# Patient Record
Sex: FEMALE | Race: WHITE | NOT HISPANIC OR LATINO
[De-identification: names, ages, dates, MRNs, and addresses within clinical notes are randomized per-mention and may not be internally consistent; named-entity substitution may affect disease eponyms.]

---

## 2019-09-18 ENCOUNTER — APPOINTMENT (OUTPATIENT)
Dept: RHEUMATOLOGY | Facility: CLINIC | Age: 69
End: 2019-09-18
Payer: MEDICARE

## 2019-09-18 VITALS
HEART RATE: 64 BPM | TEMPERATURE: 98 F | WEIGHT: 123 LBS | SYSTOLIC BLOOD PRESSURE: 149 MMHG | DIASTOLIC BLOOD PRESSURE: 73 MMHG | BODY MASS INDEX: 21.79 KG/M2 | OXYGEN SATURATION: 95 % | HEIGHT: 63 IN

## 2019-09-18 DIAGNOSIS — Z87.891 PERSONAL HISTORY OF NICOTINE DEPENDENCE: ICD-10-CM

## 2019-09-18 DIAGNOSIS — Z80.9 FAMILY HISTORY OF MALIGNANT NEOPLASM, UNSPECIFIED: ICD-10-CM

## 2019-09-18 DIAGNOSIS — Z78.9 OTHER SPECIFIED HEALTH STATUS: ICD-10-CM

## 2019-09-18 DIAGNOSIS — Z82.49 FAMILY HISTORY OF ISCHEMIC HEART DISEASE AND OTHER DISEASES OF THE CIRCULATORY SYSTEM: ICD-10-CM

## 2019-09-18 PROBLEM — Z00.00 ENCOUNTER FOR PREVENTIVE HEALTH EXAMINATION: Status: ACTIVE | Noted: 2019-09-18

## 2019-09-18 PROCEDURE — 99213 OFFICE O/P EST LOW 20 MIN: CPT | Mod: 25

## 2019-09-18 PROCEDURE — 36415 COLL VENOUS BLD VENIPUNCTURE: CPT

## 2019-09-18 RX ORDER — ASPIRIN 81 MG/1
81 TABLET, CHEWABLE ORAL
Refills: 0 | Status: ACTIVE | COMMUNITY

## 2019-09-18 RX ORDER — FOLIC ACID 1 MG
1 TABLET ORAL
Refills: 0 | Status: ACTIVE | COMMUNITY

## 2019-09-18 RX ORDER — ALPRAZOLAM 0.5 MG/1
0.5 TABLET ORAL
Qty: 120 | Refills: 0 | Status: ACTIVE | COMMUNITY
Start: 2019-07-26

## 2019-09-18 RX ORDER — MULTIVIT-MIN/FOLIC/VIT K/LYCOP 400-300MCG
TABLET ORAL
Refills: 0 | Status: ACTIVE | COMMUNITY

## 2019-09-18 RX ORDER — B-COMPLEX WITH VITAMIN C
TABLET ORAL
Refills: 0 | Status: ACTIVE | COMMUNITY

## 2019-09-18 RX ORDER — BACILLUS COAGULANS/INULIN 1B-250 MG
CAPSULE ORAL
Refills: 0 | Status: ACTIVE | COMMUNITY

## 2019-09-18 NOTE — DATA REVIEWED
[FreeTextEntry1] : she brings her most recent labs from June of 2019 these were reviewed with patient AST and ALT were normalalkaline phosphatase was normal. Albumin was just below cutoff was normal at 3.4 creatinine was slightly above normal at 1.12 CBC was normal.

## 2019-09-18 NOTE — REVIEW OF SYSTEMS
[Fever] : no fever [Chills] : no chills [Feeling Poorly] : not feeling poorly [Red Eyes] : eyes not red [Dry Eyes] : no dryness of the eyes [Sore Throat] : no sore throat [Chest Pain] : no chest pain [Palpitations] : no palpitations [Shortness Of Breath] : no shortness of breath [Cough] : no cough [SOB on Exertion] : no shortness of breath during exertion [Abdominal Pain] : no abdominal pain [Vomiting] : no vomiting [Diarrhea] : no diarrhea [Heartburn] : no heartburn [Joint Swelling] : no joint swelling [Joint Pain] : no joint pain [Arthralgias] : no arthralgias [Joint Stiffness] : no joint stiffness [Dizziness] : no dizziness [Difficulty Walking] : no difficulty walking [FreeTextEntry4] : No oral ulcer [FreeTextEntry9] : Rare pain in left wrist

## 2019-09-18 NOTE — HISTORY OF PRESENT ILLNESS
[FreeTextEntry1] : This is a 68-year-old woman she presents for followup visit she has long-standing rheumatoid arthritis but her she is on methotrexate as monotherapy she takes 20 mg once a week she is also on folic acid. She is otherwise doing well. She has had a knee replacement done comorbid conditions include elevated cholesterol she is on Crestor she's had carotid changes. She is generally doing well she denies any real significant pain swelling or stiffness of joints she has occasional flare in left wrist with occasional discomfort but none today. She does report she is scheduled for an updated bone density she did have a minor injury to her left rib cage to have some tenderness but this has improved significantly. No defined or definite fractures. She has had no nausea no vomiting no GI upset no coughing or shortness of breath.

## 2019-09-19 LAB
ALBUMIN SERPL ELPH-MCNC: 4.5 G/DL
ALP BLD-CCNC: 91 U/L
ALT SERPL-CCNC: 18 U/L
ANION GAP SERPL CALC-SCNC: 13 MMOL/L
AST SERPL-CCNC: 22 U/L
BASOPHILS # BLD AUTO: 0.04 K/UL
BASOPHILS NFR BLD AUTO: 0.6 %
BILIRUB SERPL-MCNC: 0.4 MG/DL
BUN SERPL-MCNC: 12 MG/DL
CALCIUM SERPL-MCNC: 10.4 MG/DL
CHLORIDE SERPL-SCNC: 100 MMOL/L
CO2 SERPL-SCNC: 29 MMOL/L
CREAT SERPL-MCNC: 0.6 MG/DL
EOSINOPHIL # BLD AUTO: 0.16 K/UL
EOSINOPHIL NFR BLD AUTO: 2.5 %
GLUCOSE SERPL-MCNC: 92 MG/DL
HCT VFR BLD CALC: 38.6 %
HGB BLD-MCNC: 11.8 G/DL
IMM GRANULOCYTES NFR BLD AUTO: 0.2 %
LYMPHOCYTES # BLD AUTO: 1.37 K/UL
LYMPHOCYTES NFR BLD AUTO: 21 %
MAN DIFF?: NORMAL
MCHC RBC-ENTMCNC: 29.1 PG
MCHC RBC-ENTMCNC: 30.6 GM/DL
MCV RBC AUTO: 95.3 FL
MONOCYTES # BLD AUTO: 0.5 K/UL
MONOCYTES NFR BLD AUTO: 7.7 %
NEUTROPHILS # BLD AUTO: 4.43 K/UL
NEUTROPHILS NFR BLD AUTO: 68 %
PLATELET # BLD AUTO: 210 K/UL
POTASSIUM SERPL-SCNC: 4.1 MMOL/L
PROT SERPL-MCNC: 7.2 G/DL
RBC # BLD: 4.05 M/UL
RBC # FLD: 15.9 %
SODIUM SERPL-SCNC: 142 MMOL/L
WBC # FLD AUTO: 6.51 K/UL

## 2019-12-09 ENCOUNTER — OTHER (OUTPATIENT)
Age: 69
End: 2019-12-09

## 2019-12-19 ENCOUNTER — APPOINTMENT (OUTPATIENT)
Dept: RHEUMATOLOGY | Facility: CLINIC | Age: 69
End: 2019-12-19
Payer: MEDICARE

## 2019-12-19 ENCOUNTER — TRANSCRIPTION ENCOUNTER (OUTPATIENT)
Age: 69
End: 2019-12-19

## 2019-12-19 VITALS
HEART RATE: 68 BPM | OXYGEN SATURATION: 98 % | SYSTOLIC BLOOD PRESSURE: 136 MMHG | BODY MASS INDEX: 21.79 KG/M2 | DIASTOLIC BLOOD PRESSURE: 73 MMHG | HEIGHT: 63 IN | TEMPERATURE: 97.7 F | WEIGHT: 123 LBS

## 2019-12-19 DIAGNOSIS — R21 RASH AND OTHER NONSPECIFIC SKIN ERUPTION: ICD-10-CM

## 2019-12-19 PROCEDURE — 99213 OFFICE O/P EST LOW 20 MIN: CPT

## 2019-12-20 LAB
ALBUMIN SERPL ELPH-MCNC: 4.1 G/DL
ALP BLD-CCNC: 88 U/L
ALT SERPL-CCNC: 19 U/L
ANION GAP SERPL CALC-SCNC: 11 MMOL/L
AST SERPL-CCNC: 25 U/L
BASOPHILS # BLD AUTO: 0.03 K/UL
BASOPHILS NFR BLD AUTO: 0.5 %
BILIRUB SERPL-MCNC: 0.3 MG/DL
BUN SERPL-MCNC: 15 MG/DL
CALCIUM SERPL-MCNC: 9.4 MG/DL
CHLORIDE SERPL-SCNC: 101 MMOL/L
CO2 SERPL-SCNC: 29 MMOL/L
CREAT SERPL-MCNC: 0.63 MG/DL
EOSINOPHIL # BLD AUTO: 0.21 K/UL
EOSINOPHIL NFR BLD AUTO: 3.4 %
GLUCOSE SERPL-MCNC: 93 MG/DL
HCT VFR BLD CALC: 35.9 %
HGB BLD-MCNC: 11 G/DL
IMM GRANULOCYTES NFR BLD AUTO: 0.3 %
LYMPHOCYTES # BLD AUTO: 1.15 K/UL
LYMPHOCYTES NFR BLD AUTO: 18.6 %
MAN DIFF?: NORMAL
MCHC RBC-ENTMCNC: 28.7 PG
MCHC RBC-ENTMCNC: 30.6 GM/DL
MCV RBC AUTO: 93.7 FL
MONOCYTES # BLD AUTO: 0.39 K/UL
MONOCYTES NFR BLD AUTO: 6.3 %
NEUTROPHILS # BLD AUTO: 4.38 K/UL
NEUTROPHILS NFR BLD AUTO: 70.9 %
PLATELET # BLD AUTO: 205 K/UL
POTASSIUM SERPL-SCNC: 4.3 MMOL/L
PROT SERPL-MCNC: 6.7 G/DL
RBC # BLD: 3.83 M/UL
RBC # FLD: 15.5 %
SODIUM SERPL-SCNC: 141 MMOL/L
WBC # FLD AUTO: 6.18 K/UL

## 2019-12-20 NOTE — REVIEW OF SYSTEMS
[Feeling Poorly] : not feeling poorly [Feeling Tired] : not feeling tired [Eye Pain] : no eye pain [Shortness Of Breath] : no shortness of breath [Red Eyes] : eyes not red [Wheezing] : no wheezing [Cough] : no cough [SOB on Exertion] : no shortness of breath during exertion [Abdominal Pain] : no abdominal pain [Vomiting] : no vomiting [Constipation] : no constipation [Joint Pain] : no joint pain [Arthralgias] : no arthralgias [Diarrhea] : no diarrhea [Joint Swelling] : no joint swelling [Skin Lesions] : no skin lesions [Joint Stiffness] : no joint stiffness [Skin Wound] : no skin wound [Itching] : no itching [FreeTextEntry3] : no dryness

## 2019-12-20 NOTE — ASSESSMENT
[FreeTextEntry1] : This is a 69-year-old woman she is history of osteoporosis and rheumatoid arthritis things are clinically stable we've again discussed risks and benefits of alendronate use for osteoporosis I also discussed this with her dentist there is no contraindication when she completes all her dental work in the next week or 2 she plans to start at 70 mg once a week.

## 2019-12-20 NOTE — HISTORY OF PRESENT ILLNESS
[FreeTextEntry1] : This is a 69-year-old woman she returns for reevaluation she comes to discuss management and treatment of osteoporosis. I have also had an opportunity to speak with her dentist regarding her use of alendronate she does have coexisting rheumatoid arthritis she is on methotrexate 20 mg once a week. She has been doing very well. She denies any significant pain swelling or stiffness of joints no morning stiffness no fevers or chills. No weight loss no coughing or shortness of breath.She has had no GI issues no mouth soreness of her

## 2019-12-20 NOTE — PHYSICAL EXAM
[General Appearance - Well Nourished] : well nourished [General Appearance - Well Developed] : well developed [] : no respiratory distress [Heart Rate And Rhythm] : heart rate was normal and rhythm regular [Respiration, Rhythm And Depth] : normal respiratory rhythm and effort [Auscultation Breath Sounds / Voice Sounds] : lungs were clear to auscultation bilaterally [Heart Sounds] : normal S1 and S2 [No CVA Tenderness] : no ~M costovertebral angle tenderness [Nail Clubbing] : no clubbing  or cyanosis of the fingernails [No Spinal Tenderness] : no spinal tenderness [Musculoskeletal - Swelling] : no joint swelling seen [FreeTextEntry1] : no dryness

## 2020-01-13 ENCOUNTER — TRANSCRIPTION ENCOUNTER (OUTPATIENT)
Age: 70
End: 2020-01-13

## 2020-01-15 NOTE — ASSESSMENT
[FreeTextEntry1] : 5-year-old woman long-standing rheumatoid arthritis currently on methotrexate 20 mg a week doing very well no changes in medicines. Updating bloods which will be reviewed with patient. She is scheduled for an updated bone density the next few weeks. I will await those results.
Pt presenting from UC with complaints of diffuse myalgia, cough, and sob worsening today.  Borderline hypotensive, but improved with fluids.  Noted to have LESLI without known prior baseline.  Flu A positive.  No e/o suprainfection.  Given borderline hypotension and kidney dysfunction in setting of flu decision made to admit for further monitoring.  Noted to have elevated dimer, but given positive flu and symptoms more c/w this would disregard dimer as I do not have clinical suspicion for PE.  Lactate unremarkable.  No role for abx given Flu positive and no e/o suprainfection.  Discussed case with hospitalist who has accepted for further w/u and management.  Further care per inpatient treatment team.

## 2020-03-09 ENCOUNTER — TRANSCRIPTION ENCOUNTER (OUTPATIENT)
Age: 70
End: 2020-03-09

## 2020-03-10 ENCOUNTER — TRANSCRIPTION ENCOUNTER (OUTPATIENT)
Age: 70
End: 2020-03-10

## 2020-03-20 ENCOUNTER — TRANSCRIPTION ENCOUNTER (OUTPATIENT)
Age: 70
End: 2020-03-20

## 2020-03-20 ENCOUNTER — NON-APPOINTMENT (OUTPATIENT)
Age: 70
End: 2020-03-20

## 2020-04-03 ENCOUNTER — APPOINTMENT (OUTPATIENT)
Dept: RHEUMATOLOGY | Facility: CLINIC | Age: 70
End: 2020-04-03
Payer: MEDICARE

## 2020-04-03 ENCOUNTER — TRANSCRIPTION ENCOUNTER (OUTPATIENT)
Age: 70
End: 2020-04-03

## 2020-04-03 PROCEDURE — G2012 BRIEF CHECK IN BY MD/QHP: CPT

## 2020-04-07 ENCOUNTER — NON-APPOINTMENT (OUTPATIENT)
Age: 70
End: 2020-04-07

## 2020-04-16 ENCOUNTER — APPOINTMENT (OUTPATIENT)
Dept: RHEUMATOLOGY | Facility: CLINIC | Age: 70
End: 2020-04-16

## 2020-06-01 ENCOUNTER — APPOINTMENT (OUTPATIENT)
Dept: RHEUMATOLOGY | Facility: CLINIC | Age: 70
End: 2020-06-01
Payer: MEDICARE

## 2020-06-01 PROCEDURE — 99213 OFFICE O/P EST LOW 20 MIN: CPT | Mod: 95

## 2020-06-01 NOTE — REVIEW OF SYSTEMS
[Joint Pain] : joint pain [Joint Swelling] : joint swelling [Arthralgias] : arthralgias [Fever] : no fever [Chills] : no chills [Feeling Poorly] : not feeling poorly [Feeling Tired] : not feeling tired [Shortness Of Breath] : no shortness of breath [Wheezing] : no wheezing [Cough] : no cough [FreeTextEntry3] : some dryness of eye [FreeTextEntry4] : some dry mouth [FreeTextEntry9] : isolated to left to left wrist

## 2020-06-01 NOTE — ASSESSMENT
[FreeTextEntry1] : 69-year-old woman long-standing rheumatoid arthritis also osteoporosis generally doing well we'll be updating labs today no changes in medication.

## 2020-06-01 NOTE — HISTORY OF PRESENT ILLNESS
[Home] : at home, [unfilled] , at the time of the visit. [Medical Office: ([facility])___] : at the medical office located in  [Verbal consent obtained from patient] : the patient, [unfilled] [FreeTextEntry1] : this is a 69-year-old woman she has long-standing rheumatoid arthritis she is on methotrexate 20 mg once a week as monotherapy she is also taking folic acid she reports that she is doing well on this she does report occasional mouth soreness if she misses folic acid but otherwise well shedoes have some persistent left wrist swelling and some limitation of motion she is right handed other joints are fine she's had no respiratory symptoms no coughing or shortness of breath she does have some occasional GI issues in the morning she is due for update of laboratory studies which we plan to do she is appropriately self isolating because of corona virus pandemic.- she remains on alendronate for osteoporosis.

## 2020-06-01 NOTE — PHYSICAL EXAM
[General Appearance - Alert] : alert [General Appearance - Well Nourished] : well nourished [General Appearance - Well Developed] : well developed [FreeTextEntry1] : some left wrist swelling

## 2020-06-04 ENCOUNTER — NON-APPOINTMENT (OUTPATIENT)
Age: 70
End: 2020-06-04

## 2020-06-05 LAB
ALBUMIN SERPL ELPH-MCNC: 4.6 G/DL
ALP BLD-CCNC: 81 U/L
ALT SERPL-CCNC: 19 U/L
ANION GAP SERPL CALC-SCNC: 12 MMOL/L
AST SERPL-CCNC: 24 U/L
BASOPHILS # BLD AUTO: 0.03 K/UL
BASOPHILS NFR BLD AUTO: 0.5 %
BILIRUB SERPL-MCNC: 0.4 MG/DL
BUN SERPL-MCNC: 15 MG/DL
CALCIUM SERPL-MCNC: 9.9 MG/DL
CHLORIDE SERPL-SCNC: 99 MMOL/L
CO2 SERPL-SCNC: 28 MMOL/L
CREAT SERPL-MCNC: 0.6 MG/DL
CRP SERPL-MCNC: 0.95 MG/DL
EOSINOPHIL # BLD AUTO: 0.17 K/UL
EOSINOPHIL NFR BLD AUTO: 2.9 %
ERYTHROCYTE [SEDIMENTATION RATE] IN BLOOD BY WESTERGREN METHOD: 52 MM/HR
GLUCOSE SERPL-MCNC: 90 MG/DL
HCT VFR BLD CALC: 40.7 %
HGB BLD-MCNC: 12.7 G/DL
IMM GRANULOCYTES NFR BLD AUTO: 0.2 %
LYMPHOCYTES # BLD AUTO: 1.43 K/UL
LYMPHOCYTES NFR BLD AUTO: 24.1 %
MAN DIFF?: NORMAL
MCHC RBC-ENTMCNC: 29.1 PG
MCHC RBC-ENTMCNC: 31.2 GM/DL
MCV RBC AUTO: 93.1 FL
MONOCYTES # BLD AUTO: 0.62 K/UL
MONOCYTES NFR BLD AUTO: 10.4 %
NEUTROPHILS # BLD AUTO: 3.68 K/UL
NEUTROPHILS NFR BLD AUTO: 61.9 %
PLATELET # BLD AUTO: 208 K/UL
POTASSIUM SERPL-SCNC: 4.6 MMOL/L
PROT SERPL-MCNC: 7.1 G/DL
RBC # BLD: 4.37 M/UL
RBC # FLD: 15.9 %
SODIUM SERPL-SCNC: 139 MMOL/L
WBC # FLD AUTO: 5.94 K/UL

## 2020-06-08 ENCOUNTER — TRANSCRIPTION ENCOUNTER (OUTPATIENT)
Age: 70
End: 2020-06-08

## 2020-07-14 ENCOUNTER — TRANSCRIPTION ENCOUNTER (OUTPATIENT)
Age: 70
End: 2020-07-14

## 2020-08-12 ENCOUNTER — TRANSCRIPTION ENCOUNTER (OUTPATIENT)
Age: 70
End: 2020-08-12

## 2020-10-01 ENCOUNTER — APPOINTMENT (OUTPATIENT)
Dept: RHEUMATOLOGY | Facility: CLINIC | Age: 70
End: 2020-10-01
Payer: MEDICARE

## 2020-10-01 PROCEDURE — 99213 OFFICE O/P EST LOW 20 MIN: CPT | Mod: 95

## 2020-10-01 NOTE — HISTORY OF PRESENT ILLNESS
[Home] : at home, [unfilled] , at the time of the visit. [Medical Office: ([facility])___] : at the medical office located in  [Verbal consent obtained from patient] : the patient, [unfilled] [FreeTextEntry1] : This is a 69-year-old woman seen in a telehealth visit she needs refills of her medication she has longstanding rheumatoid arthritis on methotrexate monotherapy 20 mg once a week she tolerates this very well she has no GI issues no mouth soreness no respiratory symptoms she is due for updated blood she will be doing these through lab fly.  She also has osteoporosis she was tried on alendronate she took this for about 6 or 7 months she developed severe reflux she had to stop it she is now off medication we have discussed starting Prolia however she remains concerned about coronavirus and will await a period of time when she feels comfortable and safe to come into the office to proceed with this.  She is otherwise doing well she has had no fractures.

## 2020-10-01 NOTE — REVIEW OF SYSTEMS
[Heartburn] : heartburn [Fever] : no fever [Chills] : no chills [Feeling Poorly] : not feeling poorly [Feeling Tired] : not feeling tired [Dry Eyes] : no dryness of the eyes [Chest Pain] : no chest pain [Palpitations] : no palpitations [Shortness Of Breath] : no shortness of breath [Cough] : no cough [SOB on Exertion] : no shortness of breath during exertion [Abdominal Pain] : no abdominal pain [Skin Lesions] : no skin lesions [Itching] : no itching [FreeTextEntry7] : Heartburn associated with bisphosphonate

## 2020-10-01 NOTE — REASON FOR VISIT
[Home] : at home, [unfilled] , at the time of the visit. [Medical Office: ([facility])___] : at the medical office located in  [Verbal consent obtained from patient] : the patient, [unfilled]

## 2020-10-01 NOTE — ASSESSMENT
[FreeTextEntry1] : This is a 69-year-old woman longstanding rheumatoid arthritis doing well clinically on methotrexate 20 mg a week folic acid will be updating bloods renewing prescriptions she also has a history of osteoporosis poor tolerance of alendronate will be switching to Prolia when patient feels comfortable coming into the office

## 2020-10-01 NOTE — PHYSICAL EXAM
[General Appearance - Alert] : alert [General Appearance - In No Acute Distress] : in no acute distress [General Appearance - Well Nourished] : well nourished [General Appearance - Well Developed] : well developed [FreeTextEntry1] : Visible joints including wrist appear normal

## 2020-10-03 ENCOUNTER — NON-APPOINTMENT (OUTPATIENT)
Age: 70
End: 2020-10-03

## 2020-10-04 LAB
ALBUMIN SERPL ELPH-MCNC: 4.3 G/DL
ALP BLD-CCNC: 83 U/L
ALT SERPL-CCNC: 17 U/L
ANION GAP SERPL CALC-SCNC: 12 MMOL/L
AST SERPL-CCNC: 24 U/L
BASOPHILS # BLD AUTO: 0.04 K/UL
BASOPHILS NFR BLD AUTO: 0.5 %
BILIRUB SERPL-MCNC: 0.4 MG/DL
BUN SERPL-MCNC: 9 MG/DL
CALCIUM SERPL-MCNC: 9.4 MG/DL
CHLORIDE SERPL-SCNC: 101 MMOL/L
CO2 SERPL-SCNC: 28 MMOL/L
CREAT SERPL-MCNC: 0.58 MG/DL
CRP SERPL-MCNC: 1.17 MG/DL
EOSINOPHIL # BLD AUTO: 0.19 K/UL
EOSINOPHIL NFR BLD AUTO: 2.4 %
ERYTHROCYTE [SEDIMENTATION RATE] IN BLOOD BY WESTERGREN METHOD: 53 MM/HR
GLUCOSE SERPL-MCNC: 127 MG/DL
HCT VFR BLD CALC: 38.7 %
HGB BLD-MCNC: 12 G/DL
IMM GRANULOCYTES NFR BLD AUTO: 0.3 %
LYMPHOCYTES # BLD AUTO: 1.65 K/UL
LYMPHOCYTES NFR BLD AUTO: 21.2 %
MAN DIFF?: NORMAL
MCHC RBC-ENTMCNC: 29.2 PG
MCHC RBC-ENTMCNC: 31 GM/DL
MCV RBC AUTO: 94.2 FL
MONOCYTES # BLD AUTO: 0.54 K/UL
MONOCYTES NFR BLD AUTO: 6.9 %
NEUTROPHILS # BLD AUTO: 5.33 K/UL
NEUTROPHILS NFR BLD AUTO: 68.7 %
PLATELET # BLD AUTO: 220 K/UL
POTASSIUM SERPL-SCNC: 4.1 MMOL/L
PROT SERPL-MCNC: 6.8 G/DL
RBC # BLD: 4.11 M/UL
RBC # FLD: 14.8 %
SODIUM SERPL-SCNC: 141 MMOL/L
WBC # FLD AUTO: 7.77 K/UL

## 2020-10-05 ENCOUNTER — TRANSCRIPTION ENCOUNTER (OUTPATIENT)
Age: 70
End: 2020-10-05

## 2020-12-14 ENCOUNTER — TRANSCRIPTION ENCOUNTER (OUTPATIENT)
Age: 70
End: 2020-12-14

## 2021-01-25 ENCOUNTER — TRANSCRIPTION ENCOUNTER (OUTPATIENT)
Age: 71
End: 2021-01-25

## 2021-02-03 ENCOUNTER — APPOINTMENT (OUTPATIENT)
Dept: RHEUMATOLOGY | Facility: CLINIC | Age: 71
End: 2021-02-03
Payer: MEDICARE

## 2021-02-03 PROCEDURE — 99213 OFFICE O/P EST LOW 20 MIN: CPT | Mod: 95

## 2021-02-03 NOTE — PHYSICAL EXAM
[General Appearance - Alert] : alert [General Appearance - In No Acute Distress] : in no acute distress [General Appearance - Well Developed] : well developed [General Appearance - Well Nourished] : well nourished [Sclera] : the sclera and conjunctiva were normal [FreeTextEntry1] : Visible joints look normal except for some mild swelling in her left wrist

## 2021-02-03 NOTE — HISTORY OF PRESENT ILLNESS
[FreeTextEntry1] : 70-year-old woman follow-up telehealth visit today longstanding rheumatoid arthritis doing generally well on methotrexate 20 mg every week tolerates this well no GI issues no real joint pain but occasional swelling in her wrist she also has osteoporosis she apparently tried alendronate briefly but had GI issues we had discussed Prolia however I would need an in office visit updating bloods as well as review of bone densities this has been discussed with her she also has received her first Covid vaccination she is scheduled to get her next Covid vaccination no adverse effects

## 2021-02-03 NOTE — ASSESSMENT
[FreeTextEntry1] : Longstanding rheumatoid arthritis and osteoporosis she is clinically well we are continuing her medications she feels comfortable and coming into the office following completion of second Covid vaccine I will be seeing her at that point updating bloods discussing Prolia she will be bringing her outside bone densities for review

## 2021-02-03 NOTE — REASON FOR VISIT
[Follow-Up: _____] : a [unfilled] follow-up visit [Home] : at home, [unfilled] , at the time of the visit. [Medical Office: ([facility])___] : at the medical office located in  [Verbal consent obtained from patient] : the patient, [unfilled] [FreeTextEntry1] : RHEUMATOID ARTHRITIS / OSTEOPOROSIS

## 2021-02-09 ENCOUNTER — NON-APPOINTMENT (OUTPATIENT)
Age: 71
End: 2021-02-09

## 2021-02-09 ENCOUNTER — TRANSCRIPTION ENCOUNTER (OUTPATIENT)
Age: 71
End: 2021-02-09

## 2021-02-17 ENCOUNTER — TRANSCRIPTION ENCOUNTER (OUTPATIENT)
Age: 71
End: 2021-02-17

## 2021-02-17 LAB
ALBUMIN SERPL ELPH-MCNC: 4.1 G/DL
ALP BLD-CCNC: 85 U/L
ALT SERPL-CCNC: 21 U/L
ANION GAP SERPL CALC-SCNC: 10 MMOL/L
AST SERPL-CCNC: 25 U/L
BASOPHILS # BLD AUTO: 0.04 K/UL
BASOPHILS NFR BLD AUTO: 0.8 %
BILIRUB SERPL-MCNC: 0.3 MG/DL
BUN SERPL-MCNC: 12 MG/DL
CALCIUM SERPL-MCNC: 9.2 MG/DL
CHLORIDE SERPL-SCNC: 103 MMOL/L
CO2 SERPL-SCNC: 27 MMOL/L
CREAT SERPL-MCNC: 0.68 MG/DL
EOSINOPHIL # BLD AUTO: 0.2 K/UL
EOSINOPHIL NFR BLD AUTO: 3.8 %
GLUCOSE SERPL-MCNC: 87 MG/DL
HCT VFR BLD CALC: 36.2 %
HGB BLD-MCNC: 11.3 G/DL
IMM GRANULOCYTES NFR BLD AUTO: 0.2 %
LYMPHOCYTES # BLD AUTO: 1.22 K/UL
LYMPHOCYTES NFR BLD AUTO: 23.1 %
MAN DIFF?: NORMAL
MCHC RBC-ENTMCNC: 29 PG
MCHC RBC-ENTMCNC: 31.2 GM/DL
MCV RBC AUTO: 93.1 FL
MONOCYTES # BLD AUTO: 0.46 K/UL
MONOCYTES NFR BLD AUTO: 8.7 %
NEUTROPHILS # BLD AUTO: 3.34 K/UL
NEUTROPHILS NFR BLD AUTO: 63.4 %
PLATELET # BLD AUTO: 191 K/UL
POTASSIUM SERPL-SCNC: 4.4 MMOL/L
PROT SERPL-MCNC: 6.6 G/DL
RBC # BLD: 3.89 M/UL
RBC # FLD: 15.2 %
SODIUM SERPL-SCNC: 141 MMOL/L
WBC # FLD AUTO: 5.27 K/UL

## 2021-03-29 ENCOUNTER — APPOINTMENT (OUTPATIENT)
Dept: RHEUMATOLOGY | Facility: CLINIC | Age: 71
End: 2021-03-29
Payer: MEDICARE

## 2021-03-29 VITALS
WEIGHT: 123 LBS | DIASTOLIC BLOOD PRESSURE: 82 MMHG | TEMPERATURE: 98 F | HEIGHT: 63 IN | BODY MASS INDEX: 21.79 KG/M2 | OXYGEN SATURATION: 99 % | HEART RATE: 76 BPM | SYSTOLIC BLOOD PRESSURE: 152 MMHG

## 2021-03-29 PROCEDURE — 99214 OFFICE O/P EST MOD 30 MIN: CPT

## 2021-03-30 NOTE — HISTORY OF PRESENT ILLNESS
[FreeTextEntry1] : persistant discomfort in lft wrist on OTC folate and METHOTREXATE 20 mg / week off ALENDRONATE since JULY after 6 months - discussed starting PROLIA-this is a 70 woman with longstanding rheumatoid arthritis she is on methotrexate as monotherapy she is doing generally well she tolerates methotrexate she does have some pain and swelling and discomfort in her left wrist she does not have any interference with usual activity she has no GI issues she has no mouth soreness she also has osteoporosis she brings her bone density for review and to further discuss use of Prolia she previously tried alendronate and this was poorly tolerated from a GI standpoint she has no ongoing dental issues she does bring her most recent bone densities done by GYN for review

## 2021-03-30 NOTE — PHYSICAL EXAM
[General Appearance - Alert] : alert [General Appearance - In No Acute Distress] : in no acute distress [General Appearance - Well Nourished] : well nourished [General Appearance - Well Developed] : well developed [Edema] : there was no peripheral edema [No Spinal Tenderness] : no spinal tenderness [Skin Color & Pigmentation] : normal skin color and pigmentation [] : no rash [No Focal Deficits] : no focal deficits [FreeTextEntry1] : There is very mild synovitis of the left wrist but no nodules no other tender or swollen joints

## 2021-03-30 NOTE — REVIEW OF SYSTEMS
[Dry Eyes] : dryness of the eyes [Fever] : no fever [Chills] : no chills [Feeling Poorly] : not feeling poorly [Feeling Tired] : not feeling tired [Shortness Of Breath] : no shortness of breath [Cough] : no cough [FreeTextEntry4] : dry mouth

## 2021-03-30 NOTE — ASSESSMENT
[FreeTextEntry1] : This is a 70-year-old woman longstanding rheumatoid arthritis on methotrexate doing well recent labs from February acceptable no changes in methotrexate also history of osteoporosis discussed risks and benefits of Prolia signed consent obtained will proceed in obtaining insurance approval no current dental work no contraindications to Prolia

## 2021-03-30 NOTE — DATA REVIEWED
[FreeTextEntry1] : Labs reviewed calcium acceptable bone density reviewed with patient will be scanned into chart most recent bone density from November 19, 2019 osteoporosis T score in the hip -2.7

## 2021-05-07 ENCOUNTER — APPOINTMENT (OUTPATIENT)
Dept: RHEUMATOLOGY | Facility: CLINIC | Age: 71
End: 2021-05-07
Payer: MEDICARE

## 2021-05-07 ENCOUNTER — APPOINTMENT (OUTPATIENT)
Dept: HEART AND VASCULAR | Facility: CLINIC | Age: 71
End: 2021-05-07
Payer: MEDICARE

## 2021-05-07 ENCOUNTER — NON-APPOINTMENT (OUTPATIENT)
Age: 71
End: 2021-05-07

## 2021-05-07 ENCOUNTER — MED ADMIN CHARGE (OUTPATIENT)
Age: 71
End: 2021-05-07

## 2021-05-07 VITALS
SYSTOLIC BLOOD PRESSURE: 183 MMHG | HEIGHT: 63 IN | HEART RATE: 78 BPM | DIASTOLIC BLOOD PRESSURE: 82 MMHG | WEIGHT: 124 LBS | BODY MASS INDEX: 21.97 KG/M2 | OXYGEN SATURATION: 98 %

## 2021-05-07 VITALS
OXYGEN SATURATION: 98 % | RESPIRATION RATE: 16 BRPM | BODY MASS INDEX: 21.97 KG/M2 | HEART RATE: 74 BPM | HEIGHT: 63 IN | WEIGHT: 124 LBS | DIASTOLIC BLOOD PRESSURE: 93 MMHG | TEMPERATURE: 97.8 F | SYSTOLIC BLOOD PRESSURE: 176 MMHG

## 2021-05-07 VITALS
HEART RATE: 78 BPM | OXYGEN SATURATION: 97 % | DIASTOLIC BLOOD PRESSURE: 85 MMHG | RESPIRATION RATE: 17 BRPM | SYSTOLIC BLOOD PRESSURE: 185 MMHG

## 2021-05-07 VITALS — DIASTOLIC BLOOD PRESSURE: 84 MMHG | SYSTOLIC BLOOD PRESSURE: 173 MMHG

## 2021-05-07 PROCEDURE — 99204 OFFICE O/P NEW MOD 45 MIN: CPT

## 2021-05-07 PROCEDURE — 93000 ELECTROCARDIOGRAM COMPLETE: CPT

## 2021-05-07 PROCEDURE — 96372 THER/PROPH/DIAG INJ SC/IM: CPT

## 2021-05-07 RX ORDER — DENOSUMAB 60 MG/ML
60 INJECTION SUBCUTANEOUS
Qty: 1 | Refills: 0 | Status: COMPLETED | OUTPATIENT
Start: 2021-05-07

## 2021-05-07 RX ADMIN — DENOSUMAB 0 MG/ML: 60 INJECTION SUBCUTANEOUS at 00:00

## 2021-05-07 NOTE — DISCUSSION/SUMMARY
[FreeTextEntry1] : HTN will start losartan 50 mg Qdaily, low Na diet\par JUNG/PACs EKG reviewed, will scheduled an echocardiogram\par Carotid disease, unknown severity, eill check carotid u/s\par

## 2021-05-07 NOTE — REASON FOR VISIT
Head atraumatic, normal cephalic shape. [Hypertension] : hypertension [FreeTextEntry1] : 70 year old female presents to initiate care. Patient noted to be hypertensive during her visit with Rheumatology. No chest pain noted. Dyspnea noted. This is often noted with activity. Symptoms always improve at rest. She was noted to be hypertensive in the past. On this follow up and re-evaluation she is hypertensive but comfortable. We are discussing a cardiac work up for the above complains as one has not been done recently.\par

## 2021-05-17 ENCOUNTER — TRANSCRIPTION ENCOUNTER (OUTPATIENT)
Age: 71
End: 2021-05-17

## 2021-05-21 ENCOUNTER — APPOINTMENT (OUTPATIENT)
Dept: HEART AND VASCULAR | Facility: CLINIC | Age: 71
End: 2021-05-21
Payer: MEDICARE

## 2021-05-21 PROCEDURE — 93880 EXTRACRANIAL BILAT STUDY: CPT

## 2021-05-21 PROCEDURE — 93306 TTE W/DOPPLER COMPLETE: CPT

## 2021-05-26 ENCOUNTER — APPOINTMENT (OUTPATIENT)
Dept: HEART AND VASCULAR | Facility: CLINIC | Age: 71
End: 2021-05-26
Payer: MEDICARE

## 2021-05-26 VITALS
DIASTOLIC BLOOD PRESSURE: 85 MMHG | HEIGHT: 63 IN | SYSTOLIC BLOOD PRESSURE: 176 MMHG | BODY MASS INDEX: 21.97 KG/M2 | WEIGHT: 124 LBS | OXYGEN SATURATION: 99 % | HEART RATE: 80 BPM | TEMPERATURE: 98.1 F

## 2021-05-26 VITALS — SYSTOLIC BLOOD PRESSURE: 139 MMHG | DIASTOLIC BLOOD PRESSURE: 76 MMHG

## 2021-05-26 PROCEDURE — 99214 OFFICE O/P EST MOD 30 MIN: CPT

## 2021-05-27 RX ORDER — METHOTREXATE 2.5 MG/1
2.5 TABLET ORAL
Qty: 96 | Refills: 0 | Status: COMPLETED | COMMUNITY
Start: 2019-09-05 | End: 2021-05-27

## 2021-05-27 NOTE — DISCUSSION/SUMMARY
[FreeTextEntry1] : HTN would continue losartan at this time; stress management; low Na diet; advised to check numbers at home; will be happy to re-evaluate \par SOB echocardiogram reviewed, diet and exercise advised\par Carotid disease stable, u/s reviewed, medical management with ASA and statin

## 2021-05-27 NOTE — REASON FOR VISIT
[Symptom and Test Evaluation] : symptom and test evaluation [Hypertension] : hypertension [FreeTextEntry1] : 70 year old female presents to follow up and re-evaluation. Patient noted to be hypertensive during her visit with Rheumatology. No chest pain noted. Dyspnea noted. This is often noted with activity. Symptoms always improve at rest. She was noted to be hypertensive in the past. On this follow up and re-evaluation she is hypertensive but comfortable. We are discussing a cardiac work up after her echo and carotid. She is doing well with losartan.

## 2021-06-01 ENCOUNTER — TRANSCRIPTION ENCOUNTER (OUTPATIENT)
Age: 71
End: 2021-06-01

## 2021-06-03 ENCOUNTER — APPOINTMENT (OUTPATIENT)
Dept: RHEUMATOLOGY | Facility: CLINIC | Age: 71
End: 2021-06-03
Payer: MEDICARE

## 2021-06-03 DIAGNOSIS — Z11.59 ENCOUNTER FOR SCREENING FOR OTHER VIRAL DISEASES: ICD-10-CM

## 2021-06-03 PROCEDURE — 99213 OFFICE O/P EST LOW 20 MIN: CPT | Mod: 95

## 2021-06-03 NOTE — REASON FOR VISIT
[Follow-Up: _____] : a [unfilled] follow-up visit [FreeTextEntry1] : rheumatoid arthritis / covid cooncerns

## 2021-06-03 NOTE — DATA REVIEWED
[FreeTextEntry1] : I have reviewed previous laboratory studies no previous antibody testing have been done on her

## 2021-06-03 NOTE — ASSESSMENT
[FreeTextEntry1] : This is a 70-year-old woman she has rheumatoid arthritis she is on methotrexate we will be updating routine bloods liver studies and CBC she is also on Prolia for osteoporosis which will be continued she is following with cardiology regarding her blood pressure her major issues and concerns is the effectiveness of the vaccine since she is on methotrexate and some recent studies suggesting diminished antibody response I spoke at length with her regarding this I think is the implication of the study certainly are unclear I did explain to her that current recommendations are not to routinely check antibodies following vaccine however since she is having bloods I will be checking spike protein antibodies although the significance of such is also unclear she will be again continued caution regarding exposure in view of the ongoing pandemic although positivity levels in [facility] are actually very low

## 2021-06-03 NOTE — PHYSICAL EXAM
[General Appearance - Alert] : alert [General Appearance - In No Acute Distress] : in no acute distress [General Appearance - Well Nourished] : well nourished [General Appearance - Well Developed] : well developed [General Appearance - Well-Appearing] : healthy appearing [FreeTextEntry1] : Visible joints appear normal there is some mild soft tissue swelling of the left wrist

## 2021-06-03 NOTE — HISTORY OF PRESENT ILLNESS
[Home] : at home, [unfilled] , at the time of the visit. [Medical Office: ([facility])___] : at the medical office located in  [Verbal consent obtained from patient] : the patient, [unfilled] [FreeTextEntry1] : This is a 70-year-old woman she is seen in a telehealth visit today she is concerned about Covid and her vaccination status she has longstanding rheumatoid arthritis she is currently taking methotrexate as monotherapy she is also on folic acid from a rheumatoid arthritis standpoint she reports she is doing very well she has no real pain swelling or stiffness of joints she does have some occasional discomfort in her left wrist and it has some residual swelling but is otherwise well he is tolerating methotrexate well no GI issues no coughing no shortness of breath she does have coexisting osteoporosis she is on Prolia and tolerating this well.  She has been seen by cardiology for management of blood pressure her major concern however is the effectiveness of the Covid vaccine she did receive 2 doses she is on methotrexate there has been some recent studies that suggest that there may be diminished antibody production in individuals on methotrexate she is has questions regarding whether she should have antibody studies done and what the significance of these are and what kind of precaution she needs to take

## 2021-06-04 ENCOUNTER — APPOINTMENT (OUTPATIENT)
Dept: RHEUMATOLOGY | Facility: CLINIC | Age: 71
End: 2021-06-04
Payer: MEDICARE

## 2021-06-04 PROCEDURE — 36415 COLL VENOUS BLD VENIPUNCTURE: CPT

## 2021-06-05 ENCOUNTER — TRANSCRIPTION ENCOUNTER (OUTPATIENT)
Age: 71
End: 2021-06-05

## 2021-06-05 LAB
ALBUMIN SERPL ELPH-MCNC: 4.1 G/DL
ALP BLD-CCNC: 89 U/L
ALT SERPL-CCNC: 19 U/L
ANION GAP SERPL CALC-SCNC: 12 MMOL/L
AST SERPL-CCNC: 27 U/L
BASOPHILS # BLD AUTO: 0.04 K/UL
BASOPHILS NFR BLD AUTO: 0.6 %
BILIRUB SERPL-MCNC: 0.3 MG/DL
BUN SERPL-MCNC: 12 MG/DL
CALCIUM SERPL-MCNC: 9.6 MG/DL
CHLORIDE SERPL-SCNC: 102 MMOL/L
CO2 SERPL-SCNC: 26 MMOL/L
COVID-19 SPIKE DOMAIN ANTIBODY INTERPRETATION: POSITIVE
CREAT SERPL-MCNC: 0.57 MG/DL
EOSINOPHIL # BLD AUTO: 0.17 K/UL
EOSINOPHIL NFR BLD AUTO: 2.4 %
GLUCOSE SERPL-MCNC: 103 MG/DL
HCT VFR BLD CALC: 38.3 %
HGB BLD-MCNC: 12 G/DL
IMM GRANULOCYTES NFR BLD AUTO: 0.3 %
LYMPHOCYTES # BLD AUTO: 1.39 K/UL
LYMPHOCYTES NFR BLD AUTO: 19.5 %
MAN DIFF?: NORMAL
MCHC RBC-ENTMCNC: 29.1 PG
MCHC RBC-ENTMCNC: 31.3 GM/DL
MCV RBC AUTO: 92.7 FL
MONOCYTES # BLD AUTO: 0.5 K/UL
MONOCYTES NFR BLD AUTO: 7 %
NEUTROPHILS # BLD AUTO: 5.01 K/UL
NEUTROPHILS NFR BLD AUTO: 70.2 %
PLATELET # BLD AUTO: 230 K/UL
POTASSIUM SERPL-SCNC: 4.3 MMOL/L
PROT SERPL-MCNC: 6.7 G/DL
RBC # BLD: 4.13 M/UL
RBC # FLD: 16.2 %
SARS-COV-2 AB SERPL IA-ACNC: >250 U/ML
SODIUM SERPL-SCNC: 139 MMOL/L
WBC # FLD AUTO: 7.13 K/UL

## 2021-06-07 ENCOUNTER — TRANSCRIPTION ENCOUNTER (OUTPATIENT)
Age: 71
End: 2021-06-07

## 2021-07-12 ENCOUNTER — APPOINTMENT (OUTPATIENT)
Dept: HEART AND VASCULAR | Facility: CLINIC | Age: 71
End: 2021-07-12
Payer: MEDICARE

## 2021-07-12 PROCEDURE — 99214 OFFICE O/P EST MOD 30 MIN: CPT

## 2021-07-12 PROCEDURE — 93925 LOWER EXTREMITY STUDY: CPT

## 2021-07-12 PROCEDURE — 93922 UPR/L XTREMITY ART 2 LEVELS: CPT

## 2021-07-12 NOTE — DISCUSSION/SUMMARY
[FreeTextEntry1] : HTN - JON  and I had an extensive discussion regarding his blood pressure management. Patient will continue taking current medications in addition to maintaining a low Na diet, with periodic b/p checks at home.\par HLD JON and I discussed his lipid panel and individualized target LDL goal. At this point, will do diet and exercise with anticipation of re-evaluating labs in 3-6 months\par Carotid disease minor disease noted on exam; will manage medically with current medications and re-evaluate lipids in 3-6 months\par PACs Inclined towards a conservative follow up in this patient. We had a careful discussion regarding diet and exercise. Will be happy to re-evaluate.\par

## 2021-07-12 NOTE — REASON FOR VISIT
[Symptom and Test Evaluation] : symptom and test evaluation [FreeTextEntry1] : 70 year old female presents to follow up and re-evaluation. Patient noted to be hypertensive during her visit with Rheumatology. No chest pain noted. Dyspnea noted. This is often noted with activity. Symptoms always improve at rest. She was noted to be hypertensive in the past. On this follow up and re-evaluation she is hypertensive but comfortable. We are discussing a cardiac work up after her echo and carotid. She is doing well with losartan.

## 2021-08-16 ENCOUNTER — TRANSCRIPTION ENCOUNTER (OUTPATIENT)
Age: 71
End: 2021-08-16

## 2021-08-27 ENCOUNTER — NON-APPOINTMENT (OUTPATIENT)
Age: 71
End: 2021-08-27

## 2021-09-13 ENCOUNTER — NON-APPOINTMENT (OUTPATIENT)
Age: 71
End: 2021-09-13

## 2021-10-06 ENCOUNTER — APPOINTMENT (OUTPATIENT)
Dept: RHEUMATOLOGY | Facility: CLINIC | Age: 71
End: 2021-10-06
Payer: MEDICARE

## 2021-10-06 PROCEDURE — 99442: CPT | Mod: 95

## 2021-10-06 NOTE — ASSESSMENT
[FreeTextEntry1] : doing well -I believe it is okay to reduce methotrexate to 15 mg a week continuing folic acid recheck bloods at next visit for Prolia orders will be placed in all scripts today

## 2021-10-06 NOTE — HISTORY OF PRESENT ILLNESS
[Home] : at home, [unfilled] , at the time of the visit. [Medical Office: ([facility])___] : at the medical office located in  [Verbal consent obtained from patient] : the patient, [unfilled] [FreeTextEntry1] : This is a 70-year-old woman she was scheduled for telehealth visit unfortunately there were issues with her ability to connect she was seen in a telephonic visit instead she has a history of rheumatoid arthritis she has a history of osteoporosis she has been taking methotrexate 20 mg once a week she has been doing well she is also been getting Prolia injections scheduled for next Prolia injections in about 3-1/2 to 4 weeks in November 2021 she has been vaccinated against Covid she does need updated laboratory studies which can be done at the next visit she actually reports she is feeling quite well she is hoping to further reduce her methotrexate

## 2021-10-08 LAB
ALBUMIN SERPL ELPH-MCNC: 4.1 G/DL
ALP BLD-CCNC: 86 U/L
ALT SERPL-CCNC: 15 U/L
ANION GAP SERPL CALC-SCNC: 13 MMOL/L
AST SERPL-CCNC: 22 U/L
BASOPHILS # BLD AUTO: 0.04 K/UL
BASOPHILS NFR BLD AUTO: 0.6 %
BILIRUB SERPL-MCNC: 0.4 MG/DL
BUN SERPL-MCNC: 14 MG/DL
CALCIUM SERPL-MCNC: 9.1 MG/DL
CHLORIDE SERPL-SCNC: 103 MMOL/L
CO2 SERPL-SCNC: 26 MMOL/L
CREAT SERPL-MCNC: 0.59 MG/DL
EOSINOPHIL # BLD AUTO: 0.19 K/UL
EOSINOPHIL NFR BLD AUTO: 2.7 %
GLUCOSE SERPL-MCNC: 65 MG/DL
HCT VFR BLD CALC: 37.9 %
HGB BLD-MCNC: 11.8 G/DL
IMM GRANULOCYTES NFR BLD AUTO: 0.3 %
LYMPHOCYTES # BLD AUTO: 1.68 K/UL
LYMPHOCYTES NFR BLD AUTO: 24.1 %
MAN DIFF?: NORMAL
MCHC RBC-ENTMCNC: 29.9 PG
MCHC RBC-ENTMCNC: 31.1 GM/DL
MCV RBC AUTO: 95.9 FL
MONOCYTES # BLD AUTO: 0.59 K/UL
MONOCYTES NFR BLD AUTO: 8.5 %
NEUTROPHILS # BLD AUTO: 4.46 K/UL
NEUTROPHILS NFR BLD AUTO: 63.8 %
PLATELET # BLD AUTO: 213 K/UL
POTASSIUM SERPL-SCNC: 3.8 MMOL/L
PROT SERPL-MCNC: 6.6 G/DL
RBC # BLD: 3.95 M/UL
RBC # FLD: 16.4 %
SODIUM SERPL-SCNC: 142 MMOL/L
WBC # FLD AUTO: 6.98 K/UL

## 2021-10-13 ENCOUNTER — TRANSCRIPTION ENCOUNTER (OUTPATIENT)
Age: 71
End: 2021-10-13

## 2021-10-21 ENCOUNTER — NON-APPOINTMENT (OUTPATIENT)
Age: 71
End: 2021-10-21

## 2021-10-21 ENCOUNTER — TRANSCRIPTION ENCOUNTER (OUTPATIENT)
Age: 71
End: 2021-10-21

## 2021-11-05 ENCOUNTER — APPOINTMENT (OUTPATIENT)
Dept: RHEUMATOLOGY | Facility: CLINIC | Age: 71
End: 2021-11-05

## 2021-11-24 ENCOUNTER — MED ADMIN CHARGE (OUTPATIENT)
Age: 71
End: 2021-11-24

## 2021-11-24 ENCOUNTER — APPOINTMENT (OUTPATIENT)
Dept: RHEUMATOLOGY | Facility: CLINIC | Age: 71
End: 2021-11-24
Payer: MEDICARE

## 2021-11-24 VITALS
TEMPERATURE: 98.2 F | HEART RATE: 71 BPM | OXYGEN SATURATION: 98 % | RESPIRATION RATE: 14 BRPM | SYSTOLIC BLOOD PRESSURE: 162 MMHG | DIASTOLIC BLOOD PRESSURE: 87 MMHG

## 2021-11-24 PROCEDURE — 96372 THER/PROPH/DIAG INJ SC/IM: CPT

## 2021-11-24 RX ORDER — DENOSUMAB 60 MG/ML
60 INJECTION SUBCUTANEOUS
Qty: 1 | Refills: 0 | Status: COMPLETED | OUTPATIENT
Start: 2021-11-05

## 2022-01-10 ENCOUNTER — APPOINTMENT (OUTPATIENT)
Dept: HEART AND VASCULAR | Facility: CLINIC | Age: 72
End: 2022-01-10
Payer: MEDICARE

## 2022-01-10 DIAGNOSIS — I65.21 OCCLUSION AND STENOSIS OF RIGHT CAROTID ARTERY: ICD-10-CM

## 2022-01-10 PROCEDURE — 99214 OFFICE O/P EST MOD 30 MIN: CPT | Mod: 95

## 2022-01-10 NOTE — REASON FOR VISIT
[Symptom and Test Evaluation] : symptom and test evaluation [Hypertension] : hypertension [FreeTextEntry1] : This visit was provided via telehealth using real-time 2 way audio visual technology. The patient, JON MARC, was located at home, [address]\[place] [address] , at the time of the vist. The Doctor, Dani Sotelo MD, [facility], was located at his medical office located at [address] [place] [address], [address] Floor, [place] [address] at the time of the visit. The patient, JON MARC and the Doctor participated in telehealth encounter. Verbal consent was given on Suman 10, 2022  by the patient, self.\par \par 70 year old female presents to follow up and re-evaluation. Patient noted to be hypertensive during her visit with Rheumatology. No chest pain noted. Dyspnea noted. This is often noted with activity. Symptoms always improve at rest. She was noted to be hypertensive in the past. On this follow up and re-evaluation she is hypertensive but comfortable. We are discussing a cardiac work up after her echo and carotid. She is doing well with losartan.

## 2022-01-10 NOTE — DISCUSSION/SUMMARY
[FreeTextEntry1] : HTN - JON  and I had an extensive discussion regarding his blood pressure management. Patient will continue taking current medications in addition to maintaining a low Na diet, with periodic b/p checks at home.\par HLD JON and I discussed his lipid panel and individualized target LDL goal. At this point, will do diet and exercise with anticipation of re-evaluating labs in 3-6 months\par Carotid disease minor disease noted on exam; will manage medically with current medications and re-evaluate lipids in 3-6 months\par SOB Inclined towards a conservative follow up in this patient. We had a careful discussion regarding diet and exercise. Will be happy to re-evaluate.\par

## 2022-01-24 ENCOUNTER — TRANSCRIPTION ENCOUNTER (OUTPATIENT)
Age: 72
End: 2022-01-24

## 2022-02-09 ENCOUNTER — APPOINTMENT (OUTPATIENT)
Dept: RHEUMATOLOGY | Facility: CLINIC | Age: 72
End: 2022-02-09
Payer: MEDICARE

## 2022-02-09 PROCEDURE — 99213 OFFICE O/P EST LOW 20 MIN: CPT | Mod: 95

## 2022-02-09 NOTE — PHYSICAL EXAM
[General Appearance - Alert] : alert [General Appearance - In No Acute Distress] : in no acute distress [General Appearance - Well Nourished] : well nourished [General Appearance - Well Developed] : well developed [FreeTextEntry1] : Swelling of wrist noted via telehealth

## 2022-02-09 NOTE — ASSESSMENT
[FreeTextEntry1] : 71-year-old woman history of rheumatoid arthritis on methotrexate 20 mg a week will be updating bloods today via lab fly she also has a history of osteoporosis she is on Prolia her last Prolia was in November anticipate next Prolia 6 months after last .

## 2022-02-09 NOTE — HISTORY OF PRESENT ILLNESS
[Home] : at home, [unfilled] , at the time of the visit. [Medical Office: ([facility])___] : at the medical office located in  [Verbal consent obtained from patient] : the patient, [unfilled] [FreeTextEntry1] : This is a 71-year-old woman she presents for a telehealth visit she has longstanding rheumatoid arthritis she is on methotrexate 20 mg once a week she had briefly reduced it down to 17/2 mg once a week but had increasing pain and swelling she also takes folic acid she has osteoporosis she has been on Prolia which she tolerates well she does report that she continues to at times have pain and swelling in her wrists as well as stiffness she also apparently had an ankle injury with a wound that has had some difficulty in healing she is seen dermatology she is seeing wound care she has reinjured that ankle at times she has been fully vaccinated against Covid she has had 2 boosters she continues to be concerned regarding Covid protection

## 2022-02-14 ENCOUNTER — NON-APPOINTMENT (OUTPATIENT)
Age: 72
End: 2022-02-14

## 2022-02-15 LAB
ALBUMIN SERPL ELPH-MCNC: 4.1 G/DL
ALP BLD-CCNC: 80 U/L
ALT SERPL-CCNC: 16 U/L
ANION GAP SERPL CALC-SCNC: 9 MMOL/L
AST SERPL-CCNC: 25 U/L
BASOPHILS # BLD AUTO: 0.03 K/UL
BASOPHILS NFR BLD AUTO: 0.5 %
BILIRUB SERPL-MCNC: 0.4 MG/DL
BUN SERPL-MCNC: 17 MG/DL
CALCIUM SERPL-MCNC: 9.1 MG/DL
CHLORIDE SERPL-SCNC: 103 MMOL/L
CO2 SERPL-SCNC: 27 MMOL/L
CREAT SERPL-MCNC: 0.55 MG/DL
CRP SERPL-MCNC: 10 MG/L
EOSINOPHIL # BLD AUTO: 0.22 K/UL
EOSINOPHIL NFR BLD AUTO: 3.7 %
ERYTHROCYTE [SEDIMENTATION RATE] IN BLOOD BY WESTERGREN METHOD: 25 MM/HR
GLUCOSE SERPL-MCNC: 88 MG/DL
HCT VFR BLD CALC: 35.6 %
HGB BLD-MCNC: 11.1 G/DL
IMM GRANULOCYTES NFR BLD AUTO: 0.2 %
LYMPHOCYTES # BLD AUTO: 1.4 K/UL
LYMPHOCYTES NFR BLD AUTO: 23.6 %
MAN DIFF?: NORMAL
MCHC RBC-ENTMCNC: 28.5 PG
MCHC RBC-ENTMCNC: 31.2 GM/DL
MCV RBC AUTO: 91.5 FL
MONOCYTES # BLD AUTO: 0.5 K/UL
MONOCYTES NFR BLD AUTO: 8.4 %
NEUTROPHILS # BLD AUTO: 3.78 K/UL
NEUTROPHILS NFR BLD AUTO: 63.6 %
PLATELET # BLD AUTO: 205 K/UL
POTASSIUM SERPL-SCNC: 4.9 MMOL/L
PROT SERPL-MCNC: 6.5 G/DL
RBC # BLD: 3.89 M/UL
RBC # FLD: 15.4 %
SODIUM SERPL-SCNC: 140 MMOL/L
WBC # FLD AUTO: 5.94 K/UL

## 2022-02-16 ENCOUNTER — NON-APPOINTMENT (OUTPATIENT)
Age: 72
End: 2022-02-16

## 2022-03-07 ENCOUNTER — TRANSCRIPTION ENCOUNTER (OUTPATIENT)
Age: 72
End: 2022-03-07

## 2022-04-11 PROBLEM — Z11.59 SCREENING FOR VIRAL DISEASE: Status: ACTIVE | Noted: 2021-06-03

## 2022-05-25 ENCOUNTER — APPOINTMENT (OUTPATIENT)
Dept: RHEUMATOLOGY | Facility: CLINIC | Age: 72
End: 2022-05-25
Payer: MEDICARE

## 2022-05-25 VITALS
OXYGEN SATURATION: 96 % | TEMPERATURE: 98.4 F | HEART RATE: 69 BPM | RESPIRATION RATE: 14 BRPM | DIASTOLIC BLOOD PRESSURE: 80 MMHG | SYSTOLIC BLOOD PRESSURE: 145 MMHG

## 2022-05-25 PROCEDURE — 96372 THER/PROPH/DIAG INJ SC/IM: CPT

## 2022-05-25 RX ORDER — DENOSUMAB 60 MG/ML
60 INJECTION SUBCUTANEOUS
Qty: 1 | Refills: 0 | Status: COMPLETED | OUTPATIENT
Start: 2022-05-25

## 2022-06-02 ENCOUNTER — TRANSCRIPTION ENCOUNTER (OUTPATIENT)
Age: 72
End: 2022-06-02

## 2022-06-30 ENCOUNTER — APPOINTMENT (OUTPATIENT)
Dept: RHEUMATOLOGY | Facility: CLINIC | Age: 72
End: 2022-06-30

## 2022-06-30 PROCEDURE — 99213 OFFICE O/P EST LOW 20 MIN: CPT | Mod: 95

## 2022-06-30 NOTE — ASSESSMENT
[FreeTextEntry1] : 71-year-old woman history of rheumatoid arthritis on methotrexate as monotherapy will be updating bloods again today patient will be getting these via lab fly there were no changes in medicines anticipate next Prolia shot in 5 months explained to her these are continued indefinitely

## 2022-06-30 NOTE — HISTORY OF PRESENT ILLNESS
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

## 2022-06-30 NOTE — PHYSICAL EXAM
[General Appearance - Alert] : alert [General Appearance - In No Acute Distress] : in no acute distress [General Appearance - Well Nourished] : well nourished [General Appearance - Well Developed] : well developed [FreeTextEntry1] : Visible wrist looks somewhat swollen

## 2022-06-30 NOTE — REASON FOR VISIT
[Follow-Up: _____] : a [unfilled] follow-up visit [Home] : at home, [unfilled] , at the time of the visit. [Medical Office: ([facility])___] : at the medical office located in  [Patient] : the patient [FreeTextEntry1] : rheumatoid arthritis

## 2022-06-30 NOTE — REVIEW OF SYSTEMS
[As Noted in HPI] : as noted in HPI [Fever] : no fever [Chills] : no chills [Feeling Poorly] : not feeling poorly

## 2022-07-04 ENCOUNTER — NON-APPOINTMENT (OUTPATIENT)
Age: 72
End: 2022-07-04

## 2022-07-05 ENCOUNTER — TRANSCRIPTION ENCOUNTER (OUTPATIENT)
Age: 72
End: 2022-07-05

## 2022-07-08 LAB
ALBUMIN SERPL ELPH-MCNC: 4.1 G/DL
ALP BLD-CCNC: 79 U/L
ALT SERPL-CCNC: 27 U/L
ANION GAP SERPL CALC-SCNC: 10 MMOL/L
AST SERPL-CCNC: 29 U/L
BASOPHILS # BLD AUTO: 0.04 K/UL
BASOPHILS NFR BLD AUTO: 0.6 %
BILIRUB SERPL-MCNC: 0.4 MG/DL
BUN SERPL-MCNC: 14 MG/DL
CALCIUM SERPL-MCNC: 9.2 MG/DL
CHLORIDE SERPL-SCNC: 105 MMOL/L
CO2 SERPL-SCNC: 26 MMOL/L
CREAT SERPL-MCNC: 0.6 MG/DL
CRP SERPL-MCNC: 8 MG/L
EGFR: 95 ML/MIN/1.73M2
EOSINOPHIL # BLD AUTO: 0.2 K/UL
EOSINOPHIL NFR BLD AUTO: 3.1 %
ERYTHROCYTE [SEDIMENTATION RATE] IN BLOOD BY WESTERGREN METHOD: 65 MM/HR
GLUCOSE SERPL-MCNC: 81 MG/DL
HCT VFR BLD CALC: 34.5 %
HGB BLD-MCNC: 11.1 G/DL
IMM GRANULOCYTES NFR BLD AUTO: 0.3 %
LYMPHOCYTES # BLD AUTO: 1.56 K/UL
LYMPHOCYTES NFR BLD AUTO: 24.4 %
MAN DIFF?: NORMAL
MCHC RBC-ENTMCNC: 29.7 PG
MCHC RBC-ENTMCNC: 32.2 GM/DL
MCV RBC AUTO: 92.2 FL
MONOCYTES # BLD AUTO: 0.58 K/UL
MONOCYTES NFR BLD AUTO: 9.1 %
NEUTROPHILS # BLD AUTO: 4 K/UL
NEUTROPHILS NFR BLD AUTO: 62.5 %
PLATELET # BLD AUTO: 219 K/UL
POTASSIUM SERPL-SCNC: 4.3 MMOL/L
PROT SERPL-MCNC: 6.4 G/DL
RBC # BLD: 3.74 M/UL
RBC # FLD: 15.7 %
SODIUM SERPL-SCNC: 141 MMOL/L
WBC # FLD AUTO: 6.4 K/UL

## 2022-07-11 ENCOUNTER — NON-APPOINTMENT (OUTPATIENT)
Age: 72
End: 2022-07-11

## 2022-07-12 ENCOUNTER — TRANSCRIPTION ENCOUNTER (OUTPATIENT)
Age: 72
End: 2022-07-12

## 2022-11-16 ENCOUNTER — NON-APPOINTMENT (OUTPATIENT)
Age: 72
End: 2022-11-16

## 2022-11-23 ENCOUNTER — APPOINTMENT (OUTPATIENT)
Dept: RHEUMATOLOGY | Facility: CLINIC | Age: 72
End: 2022-11-23

## 2022-11-23 ENCOUNTER — APPOINTMENT (OUTPATIENT)
Dept: HEART AND VASCULAR | Facility: CLINIC | Age: 72
End: 2022-11-23

## 2022-11-23 ENCOUNTER — NON-APPOINTMENT (OUTPATIENT)
Age: 72
End: 2022-11-23

## 2022-11-23 VITALS
OXYGEN SATURATION: 98 % | SYSTOLIC BLOOD PRESSURE: 160 MMHG | DIASTOLIC BLOOD PRESSURE: 88 MMHG | HEART RATE: 68 BPM | TEMPERATURE: 97.8 F | RESPIRATION RATE: 14 BRPM

## 2022-11-23 VITALS
SYSTOLIC BLOOD PRESSURE: 167 MMHG | OXYGEN SATURATION: 98 % | HEIGHT: 63 IN | WEIGHT: 126 LBS | BODY MASS INDEX: 22.32 KG/M2 | TEMPERATURE: 97.8 F | HEART RATE: 74 BPM | DIASTOLIC BLOOD PRESSURE: 86 MMHG

## 2022-11-23 PROCEDURE — 96372 THER/PROPH/DIAG INJ SC/IM: CPT

## 2022-11-23 PROCEDURE — 93000 ELECTROCARDIOGRAM COMPLETE: CPT

## 2022-11-23 PROCEDURE — 99214 OFFICE O/P EST MOD 30 MIN: CPT

## 2022-11-23 PROCEDURE — 36415 COLL VENOUS BLD VENIPUNCTURE: CPT

## 2022-11-23 PROCEDURE — 99213 OFFICE O/P EST LOW 20 MIN: CPT | Mod: 25

## 2022-11-23 RX ORDER — DENOSUMAB 60 MG/ML
60 INJECTION SUBCUTANEOUS
Qty: 1 | Refills: 0 | Status: COMPLETED | OUTPATIENT
Start: 2022-11-14

## 2022-11-23 RX ORDER — ROSUVASTATIN CALCIUM 5 MG/1
5 TABLET, FILM COATED ORAL
Qty: 90 | Refills: 0 | Status: COMPLETED | COMMUNITY
Start: 2019-08-26 | End: 2022-11-23

## 2022-11-23 NOTE — PHYSICAL EXAM
[FreeTextEntry1] : right wrist swelling and limited ROM  [General Appearance - Alert] : alert [General Appearance - Well Nourished] : well nourished [Musculoskeletal - Swelling] : no joint swelling seen [] : no rash

## 2022-11-23 NOTE — ASSESSMENT
[FreeTextEntry1] : 71-year-old woman returns for follow-up visit has longstanding rheumatoid arthritis on methotrexate as monotherapy 20 mg once a week comes to update blood work per tickly liver disease studies and CBC she does have single joint involvement now with marked limitation range of motion and swelling but no warmth no other joints are involved at this time clinically and she has no morning stiffness she does have coexisting osteoporosis she does get Prolia and she tolerates this well

## 2022-11-23 NOTE — HISTORY OF PRESENT ILLNESS
[FreeTextEntry1] : 71-year-old woman she has a history of rheumatoid arthritis she is on methotrexate 20 mg once a week as monotherapy she does have involvement specifically of left wrist other joints are totally asymptomatic she does have some residual swelling and marked decreased range of motion in this wrist other joints are normal  strength appears normal there is no real morning stiffness she does have osteoporosis she remains on Prolia every 6 months she is tolerating this well she has not had any injection site reactions she has not had any plans or anticipation of any ongoing dental issues

## 2022-11-23 NOTE — REASON FOR VISIT
[Symptom and Test Evaluation] : symptom and test evaluation [FreeTextEntry1] : Prisca comes in for a follow up and re-evaluation. Since last visit, she stopped crestor. Notes occasional dyspnea. Blood pressure seems to be elevated on arrival, but better at home.

## 2022-11-23 NOTE — DISCUSSION/SUMMARY
[FreeTextEntry1] : HLD reviewed labs and prior testing. Will hold off on statin for now\par HTN low Na diet cont losartan at current dose monitoring at home \par SOB hold off on imaging for now. \par EKG section of the chart --- secondary to symptoms above an electrocardiogram also known as an EKG was performed.  Risks and benefits discussed with the patient. Patient was given time and privacy to changed into a gown. Shortly after, standard 10 leads were applied and a [employer] system was used to perform the study. The results were subsequently reviewed by attending physician and discussed with the patient. The study showed a normal sinus rhythm and no ST-T suggestive of ischemia. Order for the EKG was placed in the chart. The results were documented. Billing submitted. Emotional support provided.\par  [EKG obtained to assist in diagnosis and management of assessed problem(s)] : EKG obtained to assist in diagnosis and management of assessed problem(s)

## 2022-11-24 LAB
ALBUMIN SERPL ELPH-MCNC: 4.3 G/DL
ALP BLD-CCNC: 84 U/L
ALT SERPL-CCNC: 17 U/L
ANION GAP SERPL CALC-SCNC: 13 MMOL/L
AST SERPL-CCNC: 24 U/L
BASOPHILS # BLD AUTO: 0.04 K/UL
BASOPHILS NFR BLD AUTO: 0.7 %
BILIRUB SERPL-MCNC: 0.4 MG/DL
BUN SERPL-MCNC: 12 MG/DL
CALCIUM SERPL-MCNC: 9.6 MG/DL
CHLORIDE SERPL-SCNC: 99 MMOL/L
CO2 SERPL-SCNC: 27 MMOL/L
CREAT SERPL-MCNC: 0.67 MG/DL
CRP SERPL-MCNC: 12 MG/L
EGFR: 92 ML/MIN/1.73M2
EOSINOPHIL # BLD AUTO: 0.05 K/UL
EOSINOPHIL NFR BLD AUTO: 0.9 %
GLUCOSE SERPL-MCNC: 100 MG/DL
HCT VFR BLD CALC: 36.8 %
HGB BLD-MCNC: 11.8 G/DL
IMM GRANULOCYTES NFR BLD AUTO: 0.2 %
LYMPHOCYTES # BLD AUTO: 0.85 K/UL
LYMPHOCYTES NFR BLD AUTO: 15.4 %
MAN DIFF?: NORMAL
MCHC RBC-ENTMCNC: 29.4 PG
MCHC RBC-ENTMCNC: 32.1 GM/DL
MCV RBC AUTO: 91.5 FL
MONOCYTES # BLD AUTO: 0.39 K/UL
MONOCYTES NFR BLD AUTO: 7.1 %
NEUTROPHILS # BLD AUTO: 4.17 K/UL
NEUTROPHILS NFR BLD AUTO: 75.7 %
PLATELET # BLD AUTO: 239 K/UL
POTASSIUM SERPL-SCNC: 4 MMOL/L
PROT SERPL-MCNC: 7 G/DL
RBC # BLD: 4.02 M/UL
RBC # FLD: 14.8 %
SODIUM SERPL-SCNC: 139 MMOL/L
WBC # FLD AUTO: 5.51 K/UL

## 2022-11-25 LAB — ERYTHROCYTE [SEDIMENTATION RATE] IN BLOOD BY WESTERGREN METHOD: 70 MM/HR

## 2022-11-30 ENCOUNTER — NON-APPOINTMENT (OUTPATIENT)
Age: 72
End: 2022-11-30

## 2023-04-04 ENCOUNTER — APPOINTMENT (OUTPATIENT)
Dept: RHEUMATOLOGY | Facility: CLINIC | Age: 73
End: 2023-04-04
Payer: MEDICARE

## 2023-04-04 VITALS
HEART RATE: 73 BPM | TEMPERATURE: 97.5 F | WEIGHT: 126 LBS | SYSTOLIC BLOOD PRESSURE: 160 MMHG | OXYGEN SATURATION: 97 % | BODY MASS INDEX: 22.32 KG/M2 | DIASTOLIC BLOOD PRESSURE: 85 MMHG

## 2023-04-04 PROCEDURE — 36415 COLL VENOUS BLD VENIPUNCTURE: CPT

## 2023-04-04 PROCEDURE — 99214 OFFICE O/P EST MOD 30 MIN: CPT | Mod: 25

## 2023-04-04 RX ORDER — CLINDAMYCIN HYDROCHLORIDE 300 MG/1
300 CAPSULE ORAL
Qty: 30 | Refills: 0 | Status: DISCONTINUED | COMMUNITY
Start: 2021-05-19 | End: 2023-04-04

## 2023-04-04 RX ORDER — MUPIROCIN 20 MG/G
2 OINTMENT TOPICAL
Qty: 22 | Refills: 0 | Status: DISCONTINUED | COMMUNITY
Start: 2021-05-19 | End: 2023-04-04

## 2023-04-04 RX ORDER — ALENDRONATE SODIUM 70 MG/1
70 TABLET ORAL
Qty: 12 | Refills: 3 | Status: DISCONTINUED | COMMUNITY
Start: 2019-12-19 | End: 2023-04-04

## 2023-04-04 RX ORDER — GINSENG 100 MG
CAPSULE ORAL
Refills: 0 | Status: DISCONTINUED | COMMUNITY
End: 2023-04-04

## 2023-04-04 NOTE — ASSESSMENT
[FreeTextEntry1] : 72-year-old woman returns for follow-up visit, patient here to establish care as previously followed by Dr. Martinez. Patient with longstanding history of rheumatoid arthritis, continue methotrexate 20 mg qweek as monotherapy with folic acid 1 mg qday.  Patient overall feeling well, some active symptoms in the left wrist associated with swelling but without pain at this time, likely with bony fusion given severely decreased ROM.  Will update labs today in office.  Patient also with history of osteoporosis, treated with denosumab every 6 months, next dose in May 2023.  Discussed increasing weightbearing exercises, continues calcium and vitamin D supplementation and will increase calcium and rich foods.  Patient will follow-up in 3 months or sooner as needed

## 2023-04-04 NOTE — HISTORY OF PRESENT ILLNESS
[FreeTextEntry1] : April 4, 2023\par 72-year-old woman with history of rheumatoid arthritis, currently treated with methotrexate 20 mg once per week with folic acid.\par Patient overall feeling well\par Patient with involvement of the left wrist and left 5th finger, associated with swelling of the left wrist and decreased range of motion with preserved  strength.\par Patient reports most the time feels okay, does continue to have flares in the left wrist, associated with an increase in swelling, occurs about once per month or once every couple months.  Flare does not require additional medications, usually resolves over 1 to 2 days.  Patient does not like to take extra medicine, does not take acetaminophen or NSAIDs for the pain\par Patient reports some mild shoulder issues, feels better with home stretching exercises such as yoga\par Patient is hesitant to go to the gym or physical therapy given risk of COVID infection\par Patient also with history of osteoporosis, currently treated with Prolia every 6 months, next dose due in May 2023, no side effects noted.  No dental work planned, no extractions or implants.\par Initially with symptoms in the bilateral knees, resolved following bilateral knees replacement 2008\par Discussed medications with patient\par Discussed home exercises with patient\par Reviewed previous chart and previous lab results.\par Patient is up-to-date on COVID vaccines, discussed new Prevnar 20, patient will discuss with primary care doctor

## 2023-04-04 NOTE — PHYSICAL EXAM
[General Appearance - Alert] : alert [General Appearance - In No Acute Distress] : in no acute distress [General Appearance - Well-Appearing] : healthy appearing [Sclera] : the sclera and conjunctiva were normal [Respiration, Rhythm And Depth] : normal respiratory rhythm and effort [Exaggerated Use Of Accessory Muscles For Inspiration] : no accessory muscle use [Edema] : there was no peripheral edema [Abnormal Walk] : normal gait [] : no rash [Skin Lesions] : no skin lesions [Oriented To Time, Place, And Person] : oriented to person, place, and time [Impaired Insight] : insight and judgment were intact [Affect] : the affect was normal [FreeTextEntry1] : Left wrist with edema, no tenderness, minimal warmth, decreased range of motion in all directions.  Good handgrip strength bilaterally, no synovitis of the MCPs or PIP joints.  Decreased range of motion of the shoulders at extremes of rotation.  Bilateral knee replacement.  Good range of motion of the hips.

## 2023-04-04 NOTE — REVIEW OF SYSTEMS
[Joint Pain] : joint pain [Joint Swelling] : joint swelling [Joint Stiffness] : joint stiffness [Negative] : Heme/Lymph [FreeTextEntry9] : Left wrist

## 2023-04-04 NOTE — DATA REVIEWED
[FreeTextEntry1] : Previous labs reviewed\par \par DEXA 11/19/2019\par Lumbar spine T score -0.8\par Left femoral neck T score -2.2\par Left total hip T score -2.7\par

## 2023-04-05 LAB
ALBUMIN SERPL ELPH-MCNC: 4.5 G/DL
ALP BLD-CCNC: 88 U/L
ALT SERPL-CCNC: 19 U/L
ANION GAP SERPL CALC-SCNC: 16 MMOL/L
AST SERPL-CCNC: 26 U/L
BASOPHILS # BLD AUTO: 0.03 K/UL
BASOPHILS NFR BLD AUTO: 0.6 %
BILIRUB SERPL-MCNC: 0.4 MG/DL
BUN SERPL-MCNC: 11 MG/DL
CALCIUM SERPL-MCNC: 10.4 MG/DL
CHLORIDE SERPL-SCNC: 98 MMOL/L
CO2 SERPL-SCNC: 26 MMOL/L
CREAT SERPL-MCNC: 0.58 MG/DL
CRP SERPL-MCNC: 10 MG/L
EGFR: 95 ML/MIN/1.73M2
EOSINOPHIL # BLD AUTO: 0.11 K/UL
EOSINOPHIL NFR BLD AUTO: 2 %
ERYTHROCYTE [SEDIMENTATION RATE] IN BLOOD BY WESTERGREN METHOD: 40 MM/HR
GLUCOSE SERPL-MCNC: 87 MG/DL
HCT VFR BLD CALC: 37.4 %
HGB BLD-MCNC: 12 G/DL
IMM GRANULOCYTES NFR BLD AUTO: 0.2 %
LYMPHOCYTES # BLD AUTO: 1.34 K/UL
LYMPHOCYTES NFR BLD AUTO: 24.8 %
MAN DIFF?: NORMAL
MCHC RBC-ENTMCNC: 29.4 PG
MCHC RBC-ENTMCNC: 32.1 GM/DL
MCV RBC AUTO: 91.7 FL
MONOCYTES # BLD AUTO: 0.43 K/UL
MONOCYTES NFR BLD AUTO: 8 %
NEUTROPHILS # BLD AUTO: 3.48 K/UL
NEUTROPHILS NFR BLD AUTO: 64.4 %
PLATELET # BLD AUTO: 213 K/UL
POTASSIUM SERPL-SCNC: 4.2 MMOL/L
PROT SERPL-MCNC: 7.1 G/DL
RBC # BLD: 4.08 M/UL
RBC # FLD: 15.4 %
SODIUM SERPL-SCNC: 139 MMOL/L
WBC # FLD AUTO: 5.4 K/UL

## 2023-05-26 ENCOUNTER — APPOINTMENT (OUTPATIENT)
Dept: HEART AND VASCULAR | Facility: CLINIC | Age: 73
End: 2023-05-26
Payer: MEDICARE

## 2023-05-26 ENCOUNTER — NON-APPOINTMENT (OUTPATIENT)
Age: 73
End: 2023-05-26

## 2023-05-26 ENCOUNTER — APPOINTMENT (OUTPATIENT)
Dept: RHEUMATOLOGY | Facility: CLINIC | Age: 73
End: 2023-05-26
Payer: MEDICARE

## 2023-05-26 VITALS
DIASTOLIC BLOOD PRESSURE: 83 MMHG | RESPIRATION RATE: 16 BRPM | OXYGEN SATURATION: 98 % | HEART RATE: 64 BPM | SYSTOLIC BLOOD PRESSURE: 143 MMHG | TEMPERATURE: 97.6 F

## 2023-05-26 PROCEDURE — 93000 ELECTROCARDIOGRAM COMPLETE: CPT

## 2023-05-26 PROCEDURE — 96372 THER/PROPH/DIAG INJ SC/IM: CPT

## 2023-05-26 PROCEDURE — 99214 OFFICE O/P EST MOD 30 MIN: CPT

## 2023-05-26 RX ORDER — LOSARTAN POTASSIUM 50 MG/1
50 TABLET, FILM COATED ORAL
Qty: 90 | Refills: 3 | Status: ACTIVE | COMMUNITY
Start: 2021-05-07 | End: 1900-01-01

## 2023-05-26 RX ORDER — DENOSUMAB 60 MG/ML
60 INJECTION SUBCUTANEOUS
Qty: 1 | Refills: 0 | Status: COMPLETED | OUTPATIENT
Start: 2023-05-18

## 2023-05-26 NOTE — DISCUSSION/SUMMARY
[FreeTextEntry1] : SOB check echo if able to approve and schedule. check ekg\par HTN - JON  and I had an extensive discussion regarding his blood pressure management. Patient will continue taking current medications in addition to maintaining a low Na diet, with periodic b/p checks at home. refill sent\par HLD JON and I discussed his lipid panel and individualized target LDL goal. At this point, will do diet and exercise with anticipation of re-evaluating labs in 3-6 months\par EKG section of the chart --- secondary to symptoms above an electrocardiogram also known as an EKG was performed.  Risks and benefits discussed with the patient. Patient was given time and privacy to changed into a gown. Shortly after, standard 10 leads were applied and a [employer] system was used to perform the study. The results were subsequently reviewed by attending physician and discussed with the patient. The study showed a normal sinus rhythm and no ST-T suggestive of ischemia. Order for the EKG was placed in the chart. The results were documented. Billing submitted. Emotional support provided.\par

## 2023-08-17 LAB
ALBUMIN SERPL ELPH-MCNC: 4.3 G/DL
ALP BLD-CCNC: 93 U/L
ALT SERPL-CCNC: 15 U/L
ANION GAP SERPL CALC-SCNC: 13 MMOL/L
AST SERPL-CCNC: 23 U/L
BILIRUB SERPL-MCNC: 0.4 MG/DL
BUN SERPL-MCNC: 14 MG/DL
CALCIUM SERPL-MCNC: 9.6 MG/DL
CHLORIDE SERPL-SCNC: 100 MMOL/L
CO2 SERPL-SCNC: 27 MMOL/L
CREAT SERPL-MCNC: 0.65 MG/DL
CRP SERPL-MCNC: 11 MG/L
EGFR: 92 ML/MIN/1.73M2
ERYTHROCYTE [SEDIMENTATION RATE] IN BLOOD BY WESTERGREN METHOD: 92 MM/HR
GLUCOSE SERPL-MCNC: 87 MG/DL
POTASSIUM SERPL-SCNC: 4.5 MMOL/L
PROT SERPL-MCNC: 7 G/DL
SODIUM SERPL-SCNC: 140 MMOL/L

## 2023-10-10 ENCOUNTER — APPOINTMENT (OUTPATIENT)
Dept: RHEUMATOLOGY | Facility: CLINIC | Age: 73
End: 2023-10-10
Payer: MEDICARE

## 2023-10-10 VITALS
DIASTOLIC BLOOD PRESSURE: 84 MMHG | HEART RATE: 76 BPM | OXYGEN SATURATION: 98 % | HEIGHT: 62 IN | SYSTOLIC BLOOD PRESSURE: 160 MMHG | BODY MASS INDEX: 23.37 KG/M2 | WEIGHT: 127 LBS

## 2023-10-10 PROCEDURE — 99214 OFFICE O/P EST MOD 30 MIN: CPT

## 2023-11-22 ENCOUNTER — TRANSCRIPTION ENCOUNTER (OUTPATIENT)
Age: 73
End: 2023-11-22

## 2023-11-29 ENCOUNTER — APPOINTMENT (OUTPATIENT)
Dept: RHEUMATOLOGY | Facility: CLINIC | Age: 73
End: 2023-11-29
Payer: MEDICARE

## 2023-11-29 ENCOUNTER — APPOINTMENT (OUTPATIENT)
Dept: HEART AND VASCULAR | Facility: CLINIC | Age: 73
End: 2023-11-29
Payer: MEDICARE

## 2023-11-29 VITALS
SYSTOLIC BLOOD PRESSURE: 180 MMHG | TEMPERATURE: 98.1 F | OXYGEN SATURATION: 97 % | DIASTOLIC BLOOD PRESSURE: 79 MMHG | WEIGHT: 126 LBS | HEIGHT: 62 IN | HEART RATE: 75 BPM | BODY MASS INDEX: 23.19 KG/M2

## 2023-11-29 VITALS — DIASTOLIC BLOOD PRESSURE: 72 MMHG | SYSTOLIC BLOOD PRESSURE: 155 MMHG | HEART RATE: 77 BPM

## 2023-11-29 DIAGNOSIS — I49.1 ATRIAL PREMATURE DEPOLARIZATION: ICD-10-CM

## 2023-11-29 DIAGNOSIS — R06.02 SHORTNESS OF BREATH: ICD-10-CM

## 2023-11-29 DIAGNOSIS — E78.5 HYPERLIPIDEMIA, UNSPECIFIED: ICD-10-CM

## 2023-11-29 DIAGNOSIS — I10 ESSENTIAL (PRIMARY) HYPERTENSION: ICD-10-CM

## 2023-11-29 PROCEDURE — 93306 TTE W/DOPPLER COMPLETE: CPT

## 2023-11-29 PROCEDURE — 36415 COLL VENOUS BLD VENIPUNCTURE: CPT

## 2023-11-29 PROCEDURE — 96372 THER/PROPH/DIAG INJ SC/IM: CPT

## 2023-11-29 PROCEDURE — 99214 OFFICE O/P EST MOD 30 MIN: CPT

## 2023-11-29 RX ORDER — DENOSUMAB 60 MG/ML
60 INJECTION SUBCUTANEOUS
Qty: 1 | Refills: 0 | Status: COMPLETED | OUTPATIENT
Start: 2023-11-27

## 2023-11-30 LAB
ALBUMIN SERPL ELPH-MCNC: 4.4 G/DL
ALP BLD-CCNC: 86 U/L
ALT SERPL-CCNC: 15 U/L
ANION GAP SERPL CALC-SCNC: 14 MMOL/L
AST SERPL-CCNC: 21 U/L
BASOPHILS # BLD AUTO: 0.03 K/UL
BASOPHILS NFR BLD AUTO: 0.5 %
BILIRUB SERPL-MCNC: 0.4 MG/DL
BUN SERPL-MCNC: 15 MG/DL
CALCIUM SERPL-MCNC: 10.3 MG/DL
CHLORIDE SERPL-SCNC: 98 MMOL/L
CO2 SERPL-SCNC: 27 MMOL/L
CREAT SERPL-MCNC: 0.63 MG/DL
CRP SERPL-MCNC: 8 MG/L
EGFR: 93 ML/MIN/1.73M2
EOSINOPHIL # BLD AUTO: 0.13 K/UL
EOSINOPHIL NFR BLD AUTO: 2.3 %
ERYTHROCYTE [SEDIMENTATION RATE] IN BLOOD BY WESTERGREN METHOD: 72 MM/HR
GLUCOSE SERPL-MCNC: 89 MG/DL
HCT VFR BLD CALC: 37.1 %
HGB BLD-MCNC: 12 G/DL
IMM GRANULOCYTES NFR BLD AUTO: 0.2 %
LYMPHOCYTES # BLD AUTO: 1.14 K/UL
LYMPHOCYTES NFR BLD AUTO: 19.9 %
MAN DIFF?: NORMAL
MCHC RBC-ENTMCNC: 29.6 PG
MCHC RBC-ENTMCNC: 32.3 GM/DL
MCV RBC AUTO: 91.6 FL
MONOCYTES # BLD AUTO: 0.41 K/UL
MONOCYTES NFR BLD AUTO: 7.1 %
NEUTROPHILS # BLD AUTO: 4.02 K/UL
NEUTROPHILS NFR BLD AUTO: 70 %
PLATELET # BLD AUTO: 228 K/UL
POTASSIUM SERPL-SCNC: 3.9 MMOL/L
PROT SERPL-MCNC: 7.2 G/DL
RBC # BLD: 4.05 M/UL
RBC # FLD: 15.4 %
SODIUM SERPL-SCNC: 139 MMOL/L
WBC # FLD AUTO: 5.74 K/UL

## 2023-12-12 ENCOUNTER — APPOINTMENT (OUTPATIENT)
Dept: RHEUMATOLOGY | Facility: CLINIC | Age: 73
End: 2023-12-12

## 2024-03-21 ENCOUNTER — NON-APPOINTMENT (OUTPATIENT)
Age: 74
End: 2024-03-21

## 2024-03-25 ENCOUNTER — APPOINTMENT (OUTPATIENT)
Dept: RHEUMATOLOGY | Facility: CLINIC | Age: 74
End: 2024-03-25
Payer: MEDICARE

## 2024-03-25 VITALS — SYSTOLIC BLOOD PRESSURE: 177 MMHG | DIASTOLIC BLOOD PRESSURE: 71 MMHG

## 2024-03-25 VITALS
TEMPERATURE: 96.7 F | WEIGHT: 120 LBS | HEART RATE: 74 BPM | SYSTOLIC BLOOD PRESSURE: 185 MMHG | DIASTOLIC BLOOD PRESSURE: 76 MMHG | HEIGHT: 62 IN | OXYGEN SATURATION: 97 % | BODY MASS INDEX: 22.08 KG/M2

## 2024-03-25 DIAGNOSIS — R03.0 ELEVATED BLOOD-PRESSURE READING, W/OUT DIAGNOSIS OF HYPERTENSION: ICD-10-CM

## 2024-03-25 PROCEDURE — G2211 COMPLEX E/M VISIT ADD ON: CPT

## 2024-03-25 PROCEDURE — 36415 COLL VENOUS BLD VENIPUNCTURE: CPT

## 2024-03-25 PROCEDURE — 99215 OFFICE O/P EST HI 40 MIN: CPT

## 2024-03-25 RX ORDER — METOPROLOL SUCCINATE 25 MG/1
25 TABLET, EXTENDED RELEASE ORAL
Refills: 0 | Status: ACTIVE | COMMUNITY

## 2024-03-25 RX ORDER — FOLIC ACID 1 MG/1
1 TABLET ORAL
Refills: 0 | Status: ACTIVE | COMMUNITY

## 2024-03-25 RX ORDER — METHOTREXATE 2.5 MG/1
2.5 TABLET ORAL
Qty: 96 | Refills: 3 | Status: ACTIVE | COMMUNITY
Start: 2019-12-19 | End: 1900-01-01

## 2024-03-25 RX ORDER — LOSARTAN POTASSIUM 50 MG/1
50 TABLET, FILM COATED ORAL
Refills: 0 | Status: ACTIVE | COMMUNITY

## 2024-03-25 RX ORDER — DAPAGLIFLOZIN 10 MG/1
10 TABLET, FILM COATED ORAL
Refills: 0 | Status: ACTIVE | COMMUNITY

## 2024-03-25 RX ORDER — METHOTREXATE SODIUM/PF 20 MG
20 VIAL (EA) INJECTION
Refills: 0 | Status: ACTIVE | COMMUNITY

## 2024-03-25 RX ORDER — APIXABAN 5 MG/1
5 TABLET, FILM COATED ORAL
Refills: 0 | Status: ACTIVE | COMMUNITY

## 2024-03-25 RX ORDER — ALPRAZOLAM 0.5 MG/1
0.5 TABLET ORAL
Refills: 0 | Status: ACTIVE | COMMUNITY

## 2024-03-25 RX ORDER — CALCIUM CARBONATE 600 MG
TABLET ORAL
Refills: 0 | Status: ACTIVE | COMMUNITY

## 2024-03-25 RX ORDER — BIFIDOBACTERIUM LONGUM SUBSP. INFANTIS, AVOBENZONE, HOMOSALATE, OCTISALATE, OCTOCRYLENE, AND OXYBENZONE
KIT
Refills: 0 | Status: ACTIVE | COMMUNITY

## 2024-03-25 NOTE — HISTORY OF PRESENT ILLNESS
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 Blood pressure elevated in office today (185/76), reports systolic 133, 157 at previous doctor's visits, no symptoms at this time. Prior to MI, reports flares in wrist, now joints feel much improved. Pain in knees when standing, history of bilateral TKA, no further treatment available as dod not want additional surgery, has not followed with orthopedist in some time.  Feels improvement with PT Taking MTX 20 mg weekly and folic acid (OTC) Continues Prolia q6 months, next dose June 5th While in hospital, did not take MTX for two weeks Started PT for two months for knees prior to MI Was taking losartan 50 mg previously, decreased to 25 mg as per cardiology, but increased back to 50 mg Lowered metoprolol from 50 mg to 25 due to feeling tired a few weeks Taking eliquis, no bruising noted Reviewed most recent bone density; improvement in hip to - 2.1 from -2.7  Reviewed blood work from January, hemoglobin 10.8  October 10, 2023 Patient returns for rheumatology follow-up of arthritis Patient with history of rheumatoid arthritis, currently treated with methotrexate 20 mg weekly with folic acid 1 mg/day Patient also with history of osteoporosis, continues Prolia every 6 months, next dose in November 2023. Previous labs from July 2023 reviewed during flare  Had covid vaccine about 3 weeks, stopped methotrexate for a couple of weeks for the vaccine Will have flu vaccine and RSV Feels stiff in the morning, but doesn't last too long Gets better when starts moving When sits, feels pain in both knees, right more than left since knee replacement Walks a lot, about 5,000-10,000 steps per day Balance feels a little bit off Would like to think about PT Discussed repeat DEXA, continue prolia, scheduled next month Supplements: Preserved vision for macular degeneration takes folic acid  Caltrate and vitamin d No falls  April 4, 2023 72-year-old woman with history of rheumatoid arthritis, currently treated with methotrexate 20 mg once per week with folic acid. Patient overall feeling well Patient with involvement of the left wrist and left 5th finger, associated with swelling of the left wrist and decreased range of motion with preserved  strength. Patient reports most the time feels okay, does continue to have flares in the left wrist, associated with an increase in swelling, occurs about once per month or once every couple months.  Flare does not require additional medications, usually resolves over 1 to 2 days.  Patient does not like to take extra medicine, does not take acetaminophen or NSAIDs for the pain Patient reports some mild shoulder issues, feels better with home stretching exercises such as yoga Patient is hesitant to go to the gym or physical therapy given risk of COVID infection Patient also with history of osteoporosis, currently treated with Prolia every 6 months, next dose due in May 2023, no side effects noted.  No dental work planned, no extractions or implants. Initially with symptoms in the bilateral knees, resolved following bilateral knees replacement 2008 Discussed medications with patient Discussed home exercises with patient Reviewed previous chart and previous lab results. Patient is up-to-date on COVID vaccines, discussed new Prevnar 20, patient will discuss with primary care doctor Birth Control Pills Counseling: Birth Control Pill Counseling: I discussed with the patient the potential side effects of OCPs including but not limited to increased risk of stroke, heart attack, thrombophlebitis, deep venous thrombosis, hepatic adenomas, breast changes, GI upset, headaches, and depression.  The patient verbalized understanding of the proper use and possible adverse effects of OCPs. All of the patient's questions and concerns were addressed.

## 2024-03-25 NOTE — DATA REVIEWED
[FreeTextEntry1] : Previous labs reviewed, discharge papers from hospital reviewed and scanned in chart DEXA 12/2023 Lumbar spine T-score 0.4 Left femoral neck T-score -2.3 Left total hip T-score -2.2  DEXA 11/19/2019 Lumbar spine T score -0.8 Left femoral neck T score -2.2 Left total hip T score -2.7

## 2024-03-25 NOTE — PHYSICAL EXAM
[General Appearance - Alert] : alert [General Appearance - Well Nourished] : well nourished [General Appearance - In No Acute Distress] : in no acute distress [General Appearance - Well Developed] : well developed [General Appearance - Well-Appearing] : healthy appearing [Sclera] : the sclera and conjunctiva were normal [Respiration, Rhythm And Depth] : normal respiratory rhythm and effort [] : no respiratory distress [Exaggerated Use Of Accessory Muscles For Inspiration] : no accessory muscle use [Abnormal Walk] : normal gait [Edema] : there was no peripheral edema [Oriented To Time, Place, And Person] : oriented to person, place, and time [Impaired Insight] : insight and judgment were intact [Affect] : the affect was normal [Mood] : the mood was normal [FreeTextEntry1] : left wrist with bony enlargement, no active synovitis, no tenderness, but with decreased ROM  Decrease flexion and extension of the bilateral knees.

## 2024-03-25 NOTE — ASSESSMENT
[FreeTextEntry1] : 73-year-old woman returns for follow-up visit, doing well. Patient with longstanding history of rheumatoid arthritis,  continues methotrexate 20 mg qweek as monotherapy with folic acid 1 mg qday. Patient overall feeling well, with improvement in symptoms of the left wrist with decreased swelling, likely with bony fusion given severely decreased ROM. Patient also with history of osteoporosis, treated with denosumab 60 mg every 6 months, next dose in June 2024. Reviewed repeat DEXA December 2023 with significant improvement in T scores of the hip from - 2.7 from -2.2. Patient was previously doing PT for balance and core strengthening for two months until January. Patient s/p NSTEMI with elevated troponins on 1/20/2024, possible takotsubo due to normal EKG at urgent care earlier that day, with subsequent atrial fib, now continues on eliquis 5 mg bid.  Blood pressure elevated in office today, symptomatic in terms of chest pain, headaches, jaw pain, or shortness of breath. Will repeat blood pressure on home monitor and will call cardiologist to further discuss as well. Discussed increasing weightbearing exercises, continues calcium and vitamin D supplementation and will increase calcium and rich foods. Refill for MTX ordered. Patient will follow-up in 4 months or sooner as needed.  Addendum: 5:30 PM Called patient to check on repeat blood pressure,143/70.   Feeling well. Will follow up with cardiologist.

## 2024-03-25 NOTE — END OF VISIT
[FreeTextEntry3] : All medical record entries made by the Scribe were at my, Dr. Melina Dimas MD, direction and personally dictated by me on 03/25/2024. I have reviewed the chart and agree that the record accurately reflects my personal performance of the history, physical exam, assessment and plan. I have also personally directed, reviewed, and agreed with the chart. [Time Spent: ___ minutes] : I have spent [unfilled] minutes of time on the encounter.

## 2024-03-25 NOTE — ADDENDUM
[FreeTextEntry1] : I, Philip Leiva, documented this note as a scribe on behalf of Dr. Melina Dimas MD on 03/25/2024.

## 2024-03-26 LAB
ALBUMIN SERPL ELPH-MCNC: 4.4 G/DL
ALP BLD-CCNC: 72 U/L
ALT SERPL-CCNC: 21 U/L
ANION GAP SERPL CALC-SCNC: 15 MMOL/L
AST SERPL-CCNC: 32 U/L
BASOPHILS # BLD AUTO: 0.03 K/UL
BASOPHILS NFR BLD AUTO: 0.4 %
BILIRUB SERPL-MCNC: 0.3 MG/DL
BUN SERPL-MCNC: 22 MG/DL
CALCIUM SERPL-MCNC: 10.2 MG/DL
CHLORIDE SERPL-SCNC: 102 MMOL/L
CO2 SERPL-SCNC: 24 MMOL/L
CREAT SERPL-MCNC: 0.85 MG/DL
EGFR: 71 ML/MIN/1.73M2
EOSINOPHIL # BLD AUTO: 0.12 K/UL
EOSINOPHIL NFR BLD AUTO: 1.8 %
GLUCOSE SERPL-MCNC: 95 MG/DL
HCT VFR BLD CALC: 37.6 %
HGB BLD-MCNC: 12.3 G/DL
IMM GRANULOCYTES NFR BLD AUTO: 0.3 %
IRON SATN MFR SERPL: 29 %
IRON SERPL-MCNC: 78 UG/DL
LYMPHOCYTES # BLD AUTO: 0.86 K/UL
LYMPHOCYTES NFR BLD AUTO: 12.7 %
MAN DIFF?: NORMAL
MCHC RBC-ENTMCNC: 30.4 PG
MCHC RBC-ENTMCNC: 32.7 GM/DL
MCV RBC AUTO: 93.1 FL
MONOCYTES # BLD AUTO: 0.5 K/UL
MONOCYTES NFR BLD AUTO: 7.4 %
NEUTROPHILS # BLD AUTO: 5.23 K/UL
NEUTROPHILS NFR BLD AUTO: 77.4 %
PLATELET # BLD AUTO: 188 K/UL
POTASSIUM SERPL-SCNC: 4.3 MMOL/L
PROT SERPL-MCNC: 7.3 G/DL
RBC # BLD: 4.04 M/UL
RBC # FLD: 16.2 %
SODIUM SERPL-SCNC: 142 MMOL/L
TIBC SERPL-MCNC: 269 UG/DL
UIBC SERPL-MCNC: 191 UG/DL
WBC # FLD AUTO: 6.76 K/UL

## 2024-03-27 ENCOUNTER — NON-APPOINTMENT (OUTPATIENT)
Age: 74
End: 2024-03-27

## 2024-03-27 LAB — FERRITIN SERPL-MCNC: 54 NG/ML

## 2024-04-16 DIAGNOSIS — R79.9 ABNORMAL FINDING OF BLOOD CHEMISTRY, UNSPECIFIED: ICD-10-CM

## 2024-04-18 ENCOUNTER — TRANSCRIPTION ENCOUNTER (OUTPATIENT)
Age: 74
End: 2024-04-18

## 2024-04-19 ENCOUNTER — TRANSCRIPTION ENCOUNTER (OUTPATIENT)
Age: 74
End: 2024-04-19

## 2024-06-05 ENCOUNTER — APPOINTMENT (OUTPATIENT)
Dept: RHEUMATOLOGY | Facility: CLINIC | Age: 74
End: 2024-06-05

## 2024-06-05 ENCOUNTER — APPOINTMENT (OUTPATIENT)
Dept: HEART AND VASCULAR | Facility: CLINIC | Age: 74
End: 2024-06-05

## 2024-06-17 ENCOUNTER — APPOINTMENT (OUTPATIENT)
Dept: RHEUMATOLOGY | Facility: CLINIC | Age: 74
End: 2024-06-17
Payer: MEDICARE

## 2024-06-17 VITALS
BODY MASS INDEX: 21.53 KG/M2 | OXYGEN SATURATION: 98 % | WEIGHT: 117 LBS | HEART RATE: 68 BPM | DIASTOLIC BLOOD PRESSURE: 78 MMHG | HEIGHT: 62 IN | SYSTOLIC BLOOD PRESSURE: 125 MMHG | TEMPERATURE: 98.1 F

## 2024-06-17 DIAGNOSIS — M06.9 RHEUMATOID ARTHRITIS, UNSPECIFIED: ICD-10-CM

## 2024-06-17 DIAGNOSIS — M81.0 AGE-RELATED OSTEOPOROSIS W/OUT CURRENT PATHOLOGICAL FRACTURE: ICD-10-CM

## 2024-06-17 DIAGNOSIS — M25.532 PAIN IN LEFT WRIST: ICD-10-CM

## 2024-06-17 PROCEDURE — 99215 OFFICE O/P EST HI 40 MIN: CPT

## 2024-06-17 PROCEDURE — 36415 COLL VENOUS BLD VENIPUNCTURE: CPT

## 2024-06-17 PROCEDURE — G2211 COMPLEX E/M VISIT ADD ON: CPT

## 2024-06-17 RX ORDER — ALENDRONATE SODIUM 70 MG/1
70 TABLET ORAL
Qty: 13 | Refills: 1 | Status: ACTIVE | COMMUNITY
Start: 2024-06-17 | End: 1900-01-01

## 2024-06-17 RX ORDER — PREDNISONE 10 MG/1
10 TABLET ORAL DAILY
Qty: 14 | Refills: 0 | Status: ACTIVE | COMMUNITY
Start: 2024-06-17 | End: 1900-01-01

## 2024-06-17 NOTE — HISTORY OF PRESENT ILLNESS
[FreeTextEntry1] : June 17, 2024 Patient returns for follow up of arthritis, RA Patient overall feeling better in terms of cardiac symptoms Previous diagnosis of takasubo, repeat echo improved. Meds: entresto 24/26 mg rosuvastatin from 5 to 20 mg qhs reduced stress walks everyday Better mental state Since last visit, concerned as woke up with pain in the left wrist, maybe about three weeks ago, took Aleve x 1 dose as pain was severe and was not improved with Tylenol, lasted one day or two days as severe pain Tried cbd oil as well Now it is ok. Feels some weakness in the left wrist, discussed OT referral Patient is also concerned about possible prolia contributing to pericardial disease, given improvement in bone density, patient would like to discuss transitioning off Prolia.  Will start alendronate 70 mg weekly, patient did not tolerate previously, but is willing to try again at this time.  Reviewed risks and benefits, reviewed method of administration with patient.  March 25, 2024 Patient returns for follow-up of arthritis Since last visit, patient reports hospitalization for MI, initially presented with jaw pain, noted to have elevated troponins  Patient diagnosed with MI on January 20th, told had Takasabu  Following angio (no stent placed), patient noted to have afib, treated with amiodarone, discontinued due to bradycardia. Blood pressure elevated in office today (185/76), reports systolic 133, 157 at previous doctor's visits, no symptoms at this time. Prior to MI, reports flares in wrist, now joints feel much improved. Pain in knees when standing, history of bilateral TKA, no further treatment available as dod not want additional surgery, has not followed with orthopedist in some time.  Feels improvement with PT Taking MTX 20 mg weekly and folic acid (OTC) Continues Prolia q6 months, next dose June 5th While in hospital, did not take MTX for two weeks Started PT for two months for knees prior to MI Was taking losartan 50 mg previously, decreased to 25 mg as per cardiology, but increased back to 50 mg Lowered metoprolol from 50 mg to 25 due to feeling tired a few weeks Taking eliquis, no bruising noted Reviewed most recent bone density; improvement in hip to - 2.1 from -2.7  Reviewed blood work from January, hemoglobin 10.8  October 10, 2023 Patient returns for rheumatology follow-up of arthritis Patient with history of rheumatoid arthritis, currently treated with methotrexate 20 mg weekly with folic acid 1 mg/day Patient also with history of osteoporosis, continues Prolia every 6 months, next dose in November 2023. Previous labs from July 2023 reviewed during flare  Had covid vaccine about 3 weeks, stopped methotrexate for a couple of weeks for the vaccine Will have flu vaccine and RSV Feels stiff in the morning, but doesn't last too long Gets better when starts moving When sits, feels pain in both knees, right more than left since knee replacement Walks a lot, about 5,000-10,000 steps per day Balance feels a little bit off Would like to think about PT Discussed repeat DEXA, continue prolia, scheduled next month Supplements: Preserved vision for macular degeneration takes folic acid  Caltrate and vitamin d No falls  April 4, 2023 72-year-old woman with history of rheumatoid arthritis, currently treated with methotrexate 20 mg once per week with folic acid. Patient overall feeling well Patient with involvement of the left wrist and left 5th finger, associated with swelling of the left wrist and decreased range of motion with preserved  strength. Patient reports most the time feels okay, does continue to have flares in the left wrist, associated with an increase in swelling, occurs about once per month or once every couple months.  Flare does not require additional medications, usually resolves over 1 to 2 days.  Patient does not like to take extra medicine, does not take acetaminophen or NSAIDs for the pain Patient reports some mild shoulder issues, feels better with home stretching exercises such as yoga Patient is hesitant to go to the gym or physical therapy given risk of COVID infection Patient also with history of osteoporosis, currently treated with Prolia every 6 months, next dose due in May 2023, no side effects noted.  No dental work planned, no extractions or implants. Initially with symptoms in the bilateral knees, resolved following bilateral knees replacement 2008 Discussed medications with patient Discussed home exercises with patient Reviewed previous chart and previous lab results. Patient is up-to-date on COVID vaccines, discussed new Prevnar 20, patient will discuss with primary care doctor

## 2024-06-17 NOTE — DATA REVIEWED
Adequate: hears normal conversation without difficulty [FreeTextEntry1] : Previous labs reviewed, discharge papers from hospital reviewed and scanned in chart DEXA 12/2023 Lumbar spine T-score 0.4 Left femoral neck T-score -2.3 Left total hip T-score -2.2  DEXA 11/19/2019 Lumbar spine T score -0.8 Left femoral neck T score -2.2 Left total hip T score -2.7

## 2024-06-17 NOTE — ADDENDUM
[FreeTextEntry1] : I, Avinash Ivett, documented this note as a scribe on behalf of Dr. Melina Dimas MD on 06/17/2024.  All medical record entries made by the Scribe were at my, Dr. Melina Dimas MD, direction and personally dictated by me on 06/17/2024. I have reviewed the chart and agree that the record accurately reflects my personal performance of the history, physical exam, assessment and plan. I have also personally directed, reviewed, and agreed with the chart.

## 2024-06-17 NOTE — PHYSICAL EXAM
[General Appearance - Alert] : alert [General Appearance - In No Acute Distress] : in no acute distress [General Appearance - Well Nourished] : well nourished [General Appearance - Well Developed] : well developed [General Appearance - Well-Appearing] : healthy appearing [Sclera] : the sclera and conjunctiva were normal [] : no respiratory distress [Respiration, Rhythm And Depth] : normal respiratory rhythm and effort [Exaggerated Use Of Accessory Muscles For Inspiration] : no accessory muscle use [Edema] : there was no peripheral edema [Oriented To Time, Place, And Person] : oriented to person, place, and time [Impaired Insight] : insight and judgment were intact [Affect] : the affect was normal [Mood] : the mood was normal [Abnormal Walk] : normal gait [FreeTextEntry1] : left wrist with bony enlargement, no active synovitis, no tenderness, but with decreased ROM  Decrease flexion and extension of the bilateral knees.

## 2024-06-17 NOTE — REVIEW OF SYSTEMS
[Arthralgias] : arthralgias [Joint Stiffness] : joint stiffness [Negative] : Heme/Lymph [FreeTextEntry9] : left wrist

## 2024-06-17 NOTE — ASSESSMENT
[FreeTextEntry1] : 73-year-old woman returns for follow-up visit, doing well. Patient with longstanding history of rheumatoid arthritis,  continues methotrexate 20 mg qweek as monotherapy with folic acid 1 mg qday. Patient overall feeling well, with recent flare of joint symptoms of the left wrist, now with decreased swelling, likely with bony fusion given severely decreased ROM. Patient also with history of osteoporosis, treated with denosumab 60 mg every 6 months, next dose in June 2024. Reviewed repeat DEXA December 2023 with significant improvement in T scores of the hip from - 2.7 from -2.2. Patient was previously doing PT for balance and core strengthening for two months until January 2024.  Patient s/p NSTEMI with elevated troponins on 1/20/2024, possible takotsubo due to normal EKG at urgent care earlier that day, with subsequent atrial fib, now continues on eliquis 5 mg bid.  Patient concerned about side effects of denosumab treatments, discussed transitioning off denosumab, given risk of flares of resorption after discontinuation of denosumab, will start alendronate 70 mg weekly.  Patient took alendronate in the past with some GI intolerance, would like to try again.  If unable to tolerate, discussed restarting denosumab.  Discussed increasing weightbearing exercises, continues calcium and vitamin D supplementation and will increase calcium and rich foods. Recent labs reviewed from primary care doctor, scanned into chart today.  Prescription for prednisone 10 mg to take in case of severe flares.  Patient will follow-up in 3 to 4 months or sooner as needed.

## 2024-06-19 ENCOUNTER — TRANSCRIPTION ENCOUNTER (OUTPATIENT)
Age: 74
End: 2024-06-19

## 2024-06-20 ENCOUNTER — TRANSCRIPTION ENCOUNTER (OUTPATIENT)
Age: 74
End: 2024-06-20

## 2024-07-15 ENCOUNTER — TRANSCRIPTION ENCOUNTER (OUTPATIENT)
Age: 74
End: 2024-07-15

## 2024-07-18 ENCOUNTER — TRANSCRIPTION ENCOUNTER (OUTPATIENT)
Age: 74
End: 2024-07-18

## 2024-08-22 ENCOUNTER — TRANSCRIPTION ENCOUNTER (OUTPATIENT)
Age: 74
End: 2024-08-22

## 2024-09-17 ENCOUNTER — NON-APPOINTMENT (OUTPATIENT)
Age: 74
End: 2024-09-17

## 2024-09-18 ENCOUNTER — APPOINTMENT (OUTPATIENT)
Dept: RHEUMATOLOGY | Facility: CLINIC | Age: 74
End: 2024-09-18
Payer: MEDICARE

## 2024-09-18 VITALS
BODY MASS INDEX: 20.98 KG/M2 | WEIGHT: 114 LBS | DIASTOLIC BLOOD PRESSURE: 71 MMHG | OXYGEN SATURATION: 98 % | HEIGHT: 62 IN | TEMPERATURE: 97.2 F | HEART RATE: 67 BPM | SYSTOLIC BLOOD PRESSURE: 148 MMHG

## 2024-09-18 DIAGNOSIS — M06.9 RHEUMATOID ARTHRITIS, UNSPECIFIED: ICD-10-CM

## 2024-09-18 DIAGNOSIS — M81.0 AGE-RELATED OSTEOPOROSIS W/OUT CURRENT PATHOLOGICAL FRACTURE: ICD-10-CM

## 2024-09-18 PROCEDURE — G2211 COMPLEX E/M VISIT ADD ON: CPT

## 2024-09-18 PROCEDURE — 99214 OFFICE O/P EST MOD 30 MIN: CPT

## 2024-09-18 PROCEDURE — 36415 COLL VENOUS BLD VENIPUNCTURE: CPT

## 2024-09-19 ENCOUNTER — NON-APPOINTMENT (OUTPATIENT)
Age: 74
End: 2024-09-19

## 2024-09-19 LAB
ALBUMIN SERPL ELPH-MCNC: 4.2 G/DL
ALP BLD-CCNC: 75 U/L
ALT SERPL-CCNC: 19 U/L
ANION GAP SERPL CALC-SCNC: 14 MMOL/L
AST SERPL-CCNC: 33 U/L
BASOPHILS # BLD AUTO: 0.04 K/UL
BASOPHILS NFR BLD AUTO: 0.7 %
BILIRUB SERPL-MCNC: 0.3 MG/DL
BUN SERPL-MCNC: 21 MG/DL
CALCIUM SERPL-MCNC: 10.2 MG/DL
CHLORIDE SERPL-SCNC: 99 MMOL/L
CO2 SERPL-SCNC: 26 MMOL/L
CREAT SERPL-MCNC: 0.79 MG/DL
EGFR: 78 ML/MIN/1.73M2
EOSINOPHIL # BLD AUTO: 0.19 K/UL
EOSINOPHIL NFR BLD AUTO: 3.3 %
GLUCOSE SERPL-MCNC: 85 MG/DL
HCT VFR BLD CALC: 29.7 %
HGB BLD-MCNC: 9.1 G/DL
IMM GRANULOCYTES NFR BLD AUTO: 0.3 %
LYMPHOCYTES # BLD AUTO: 1.17 K/UL
LYMPHOCYTES NFR BLD AUTO: 20.5 %
MAN DIFF?: NORMAL
MCHC RBC-ENTMCNC: 29.1 PG
MCHC RBC-ENTMCNC: 30.6 GM/DL
MCV RBC AUTO: 94.9 FL
MONOCYTES # BLD AUTO: 0.39 K/UL
MONOCYTES NFR BLD AUTO: 6.8 %
NEUTROPHILS # BLD AUTO: 3.91 K/UL
NEUTROPHILS NFR BLD AUTO: 68.4 %
PLATELET # BLD AUTO: 172 K/UL
POTASSIUM SERPL-SCNC: 4.6 MMOL/L
PROT SERPL-MCNC: 6.4 G/DL
RBC # BLD: 3.13 M/UL
RBC # FLD: 15.5 %
SODIUM SERPL-SCNC: 138 MMOL/L
WBC # FLD AUTO: 5.72 K/UL

## 2024-09-20 NOTE — HISTORY OF PRESENT ILLNESS
[FreeTextEntry1] : September 18, 2024 Patient returns for a follow up visit of RA Currently experiencing intermittent joint pain in the index finger and thumb of the left hand Currently attending hand therapy re: left hand joint pain  Patient reports pins and needles sensation in the left elbow intermittently, more with pressure on the left elbow Patient currently experiencing bilateral intermittent knee joint pain   Patient reports 3 afib episodes in June 2024, continues eliquis  Patient reports recent incident of falling onto her head one month ago, no headaches or ecchymosis noted  Patient had covid infection in August, tested negative after three weeks Takes acetaminophen as needed for pain, about 2-3 times per month Followed up with cardiologist one month ago  Discontinued losartan  Will discontinue alendronate to complete six months after completed prolia treatment Currently using CBD oil to treat left hand joint pain  Patient has restarted MTX post covid infection  Discussed flu and covid vaccines  June 17, 2024 Patient returns for follow up of arthritis, RA Patient overall feeling better in terms of cardiac symptoms Previous diagnosis of takasubo, repeat echo improved. Meds: entresto 24/26 mg rosuvastatin from 5 to 20 mg qhs reduced stress walks everyday Better mental state Since last visit, concerned as woke up with pain in the left wrist, maybe about three weeks ago, took Aleve x 1 dose as pain was severe and was not improved with Tylenol, lasted one day or two days as severe pain Tried cbd oil as well Now it is ok. Feels some weakness in the left wrist, discussed OT referral Patient is also concerned about possible prolia contributing to pericardial disease, given improvement in bone density, patient would like to discuss transitioning off Prolia.  Will start alendronate 70 mg weekly, patient did not tolerate previously, but is willing to try again at this time.  Reviewed risks and benefits, reviewed method of administration with patient.  March 25, 2024 Patient returns for follow-up of arthritis Since last visit, patient reports hospitalization for MI, initially presented with jaw pain, noted to have elevated troponins  Patient diagnosed with MI on January 20th, told had Takasabu  Following angio (no stent placed), patient noted to have afib, treated with amiodarone, discontinued due to bradycardia. Blood pressure elevated in office today (185/76), reports systolic 133, 157 at previous doctor's visits, no symptoms at this time. Prior to MI, reports flares in wrist, now joints feel much improved. Pain in knees when standing, history of bilateral TKA, no further treatment available as dod not want additional surgery, has not followed with orthopedist in some time.  Feels improvement with PT Taking MTX 20 mg weekly and folic acid (OTC) Continues Prolia q6 months, next dose June 5th While in hospital, did not take MTX for two weeks Started PT for two months for knees prior to MI Was taking losartan 50 mg previously, decreased to 25 mg as per cardiology, but increased back to 50 mg Lowered metoprolol from 50 mg to 25 due to feeling tired a few weeks Taking eliquis, no bruising noted Reviewed most recent bone density; improvement in hip to - 2.1 from -2.7  Reviewed blood work from January, hemoglobin 10.8  October 10, 2023 Patient returns for rheumatology follow-up of arthritis Patient with history of rheumatoid arthritis, currently treated with methotrexate 20 mg weekly with folic acid 1 mg/day Patient also with history of osteoporosis, continues Prolia every 6 months, next dose in November 2023. Previous labs from July 2023 reviewed during flare  Had covid vaccine about 3 weeks, stopped methotrexate for a couple of weeks for the vaccine Will have flu vaccine and RSV Feels stiff in the morning, but doesn't last too long Gets better when starts moving When sits, feels pain in both knees, right more than left since knee replacement Walks a lot, about 5,000-10,000 steps per day Balance feels a little bit off Would like to think about PT Discussed repeat DEXA, continue prolia, scheduled next month Supplements: Preserved vision for macular degeneration takes folic acid  Caltrate and vitamin d No falls  April 4, 2023 72-year-old woman with history of rheumatoid arthritis, currently treated with methotrexate 20 mg once per week with folic acid. Patient overall feeling well Patient with involvement of the left wrist and left 5th finger, associated with swelling of the left wrist and decreased range of motion with preserved  strength. Patient reports most the time feels okay, does continue to have flares in the left wrist, associated with an increase in swelling, occurs about once per month or once every couple months.  Flare does not require additional medications, usually resolves over 1 to 2 days.  Patient does not like to take extra medicine, does not take acetaminophen or NSAIDs for the pain Patient reports some mild shoulder issues, feels better with home stretching exercises such as yoga Patient is hesitant to go to the gym or physical therapy given risk of COVID infection Patient also with history of osteoporosis, currently treated with Prolia every 6 months, next dose due in May 2023, no side effects noted.  No dental work planned, no extractions or implants. Initially with symptoms in the bilateral knees, resolved following bilateral knees replacement 2008 Discussed medications with patient Discussed home exercises with patient Reviewed previous chart and previous lab results. Patient is up-to-date on COVID vaccines, discussed new Prevnar 20, patient will discuss with primary care doctor

## 2024-09-20 NOTE — REVIEW OF SYSTEMS
[Joint Pain] : joint pain [Joint Swelling] : joint swelling [Negative] : Heme/Lymph [FreeTextEntry9] : left thumb and index finger pain, restricted ROM in the left wrist, pins and needles sensation in the left elbow, bilateral intermittent knee joint pain

## 2024-09-20 NOTE — ADDENDUM
[FreeTextEntry1] :  I, Amparo Tsang, acted solely as a scribe for Dr. Melina Dimas, direction and personally dictated by me on 09/18/2024. I have reviewed the chart and agree that the record accurately reflects my personal performance of the history, physical exam, assessment, and plan. I have also personally directed, reviewed, and agreed with the chart.

## 2024-09-20 NOTE — PHYSICAL EXAM
[General Appearance - In No Acute Distress] : in no acute distress [General Appearance - Alert] : alert [Oriented To Time, Place, And Person] : oriented to person, place, and time [Impaired Insight] : insight and judgment were intact [Affect] : the affect was normal [Sclera] : the sclera and conjunctiva were normal [Respiration, Rhythm And Depth] : normal respiratory rhythm and effort [Exaggerated Use Of Accessory Muscles For Inspiration] : no accessory muscle use [Auscultation Breath Sounds / Voice Sounds] : lungs were clear to auscultation bilaterally [Edema] : there was no peripheral edema [No Spinal Tenderness] : no spinal tenderness [] : no rash [FreeTextEntry1] : small effusion of the left wrist, no tenderness, good handgrip bilaterally.

## 2024-09-20 NOTE — ASSESSMENT
[FreeTextEntry1] : 73-year-old woman returns for follow-up visit, doing well. Patient with longstanding history of rheumatoid arthritis,  continues methotrexate 20 mg qweek as monotherapy with folic acid 1 mg qday. Patient with recent covid infection in August 2024, held methotrexate during that time, now restarted.  Patient with mild flare of symptoms, predominantly of the left wrist, now improving.  Patient also with history of osteoporosis, treated with denosumab 60 mg every 6 months, last dose in 11/2023. DEXA December 2023 with significant improvement in T scores of the hip from - 2.7 from -2.2, now transitioning off densoumab with alendronate 70 mg qweekly for six months. Patient was previously doing PT for balance and core strengthening for two months until January 2024.  Patient s/p NSTEMI with elevated troponins on 1/20/2024, possible takotsubo due to normal EKG at urgent care earlier that day, with subsequent atrial fib, now continues on eliquis 5 mg bid. Patient experienced three atrial fib episodes in June. Cardiologist discontinued losartan, currently taking valsartan 40mg. Patient currently attending OT re: left hand joint pain, with Tylenol extra strength as needed, about 2-3 times this month, and CBD oil as well for pain. Discussed thumb immobilizer as well for pain. Discussed increasing weightbearing exercises, continues calcium and vitamin D supplementation and will increase calcium and rich foods. Labs will be updated in office including CBC, CMP today.  Patient will follow-up in 3 to 4 months or sooner as needed.

## 2025-01-13 ENCOUNTER — TRANSCRIPTION ENCOUNTER (OUTPATIENT)
Age: 75
End: 2025-01-13

## 2025-01-21 ENCOUNTER — APPOINTMENT (OUTPATIENT)
Dept: RHEUMATOLOGY | Facility: CLINIC | Age: 75
End: 2025-01-21
Payer: MEDICARE

## 2025-01-21 VITALS
DIASTOLIC BLOOD PRESSURE: 76 MMHG | WEIGHT: 114 LBS | SYSTOLIC BLOOD PRESSURE: 138 MMHG | TEMPERATURE: 97.7 F | HEIGHT: 62 IN | BODY MASS INDEX: 20.98 KG/M2 | OXYGEN SATURATION: 98 % | HEART RATE: 88 BPM

## 2025-01-21 DIAGNOSIS — M06.9 RHEUMATOID ARTHRITIS, UNSPECIFIED: ICD-10-CM

## 2025-01-21 DIAGNOSIS — D64.9 ANEMIA, UNSPECIFIED: ICD-10-CM

## 2025-01-21 PROCEDURE — G2211 COMPLEX E/M VISIT ADD ON: CPT

## 2025-01-21 PROCEDURE — 36415 COLL VENOUS BLD VENIPUNCTURE: CPT

## 2025-01-21 PROCEDURE — 99214 OFFICE O/P EST MOD 30 MIN: CPT

## 2025-01-21 RX ORDER — VALSARTAN 40 MG/1
40 TABLET, COATED ORAL
Refills: 0 | Status: ACTIVE | COMMUNITY

## 2025-01-22 ENCOUNTER — NON-APPOINTMENT (OUTPATIENT)
Age: 75
End: 2025-01-22

## 2025-01-22 LAB
ALBUMIN SERPL ELPH-MCNC: 4 G/DL
ALP BLD-CCNC: 95 U/L
ALT SERPL-CCNC: 67 U/L
ANION GAP SERPL CALC-SCNC: 11 MMOL/L
AST SERPL-CCNC: 65 U/L
BASOPHILS # BLD AUTO: 0.04 K/UL
BASOPHILS NFR BLD AUTO: 0.9 %
BILIRUB SERPL-MCNC: 0.4 MG/DL
BUN SERPL-MCNC: 21 MG/DL
CALCIUM SERPL-MCNC: 9.4 MG/DL
CHLORIDE SERPL-SCNC: 102 MMOL/L
CO2 SERPL-SCNC: 28 MMOL/L
CREAT SERPL-MCNC: 0.79 MG/DL
EGFR: 77 ML/MIN/1.73M2
EOSINOPHIL # BLD AUTO: 0.1 K/UL
EOSINOPHIL NFR BLD AUTO: 2.3 %
FERRITIN SERPL-MCNC: 39 NG/ML
FOLATE SERPL-MCNC: >20 NG/ML
GLUCOSE SERPL-MCNC: 82 MG/DL
HCT VFR BLD CALC: 33.2 %
HGB BLD-MCNC: 9.9 G/DL
IMM GRANULOCYTES NFR BLD AUTO: 0.2 %
IRON SATN MFR SERPL: 24 %
IRON SERPL-MCNC: 78 UG/DL
LYMPHOCYTES # BLD AUTO: 0.8 K/UL
LYMPHOCYTES NFR BLD AUTO: 18.3 %
MAN DIFF?: NORMAL
MCHC RBC-ENTMCNC: 27.6 PG
MCHC RBC-ENTMCNC: 29.8 G/DL
MCV RBC AUTO: 92.5 FL
MONOCYTES # BLD AUTO: 0.44 K/UL
MONOCYTES NFR BLD AUTO: 10.1 %
NEUTROPHILS # BLD AUTO: 2.98 K/UL
NEUTROPHILS NFR BLD AUTO: 68.2 %
PLATELET # BLD AUTO: 185 K/UL
POTASSIUM SERPL-SCNC: 4 MMOL/L
PROT SERPL-MCNC: 6.6 G/DL
RBC # BLD: 3.59 M/UL
RBC # FLD: 20 %
SODIUM SERPL-SCNC: 141 MMOL/L
TIBC SERPL-MCNC: 324 UG/DL
UIBC SERPL-MCNC: 246 UG/DL
VIT B12 SERPL-MCNC: 1231 PG/ML
WBC # FLD AUTO: 4.37 K/UL

## 2025-02-06 ENCOUNTER — TRANSCRIPTION ENCOUNTER (OUTPATIENT)
Age: 75
End: 2025-02-06

## 2025-03-13 ENCOUNTER — TRANSCRIPTION ENCOUNTER (OUTPATIENT)
Age: 75
End: 2025-03-13

## 2025-05-12 ENCOUNTER — TRANSCRIPTION ENCOUNTER (OUTPATIENT)
Age: 75
End: 2025-05-12

## 2025-05-21 ENCOUNTER — APPOINTMENT (OUTPATIENT)
Dept: RHEUMATOLOGY | Facility: CLINIC | Age: 75
End: 2025-05-21
Payer: MEDICARE

## 2025-05-21 VITALS
SYSTOLIC BLOOD PRESSURE: 136 MMHG | TEMPERATURE: 97.7 F | WEIGHT: 112 LBS | HEIGHT: 62 IN | DIASTOLIC BLOOD PRESSURE: 76 MMHG | BODY MASS INDEX: 20.61 KG/M2 | HEART RATE: 69 BPM | OXYGEN SATURATION: 98 %

## 2025-05-21 DIAGNOSIS — M06.9 RHEUMATOID ARTHRITIS, UNSPECIFIED: ICD-10-CM

## 2025-05-21 PROCEDURE — G2211 COMPLEX E/M VISIT ADD ON: CPT

## 2025-05-21 PROCEDURE — 99215 OFFICE O/P EST HI 40 MIN: CPT

## 2025-06-02 ENCOUNTER — TRANSCRIPTION ENCOUNTER (OUTPATIENT)
Age: 75
End: 2025-06-02

## 2025-06-05 ENCOUNTER — TRANSCRIPTION ENCOUNTER (OUTPATIENT)
Age: 75
End: 2025-06-05

## 2025-06-12 ENCOUNTER — TRANSCRIPTION ENCOUNTER (OUTPATIENT)
Age: 75
End: 2025-06-12

## 2025-07-09 ENCOUNTER — TRANSCRIPTION ENCOUNTER (OUTPATIENT)
Age: 75
End: 2025-07-09

## 2025-07-11 ENCOUNTER — TRANSCRIPTION ENCOUNTER (OUTPATIENT)
Age: 75
End: 2025-07-11

## 2025-07-28 ENCOUNTER — APPOINTMENT (OUTPATIENT)
Dept: RHEUMATOLOGY | Facility: CLINIC | Age: 75
End: 2025-07-28

## 2025-08-26 ENCOUNTER — NON-APPOINTMENT (OUTPATIENT)
Age: 75
End: 2025-08-26

## 2025-08-28 ENCOUNTER — APPOINTMENT (OUTPATIENT)
Dept: RHEUMATOLOGY | Facility: CLINIC | Age: 75
End: 2025-08-28
Payer: MEDICARE

## 2025-08-28 ENCOUNTER — LABORATORY RESULT (OUTPATIENT)
Age: 75
End: 2025-08-28

## 2025-08-28 VITALS
HEIGHT: 62 IN | BODY MASS INDEX: 19.32 KG/M2 | TEMPERATURE: 97.2 F | WEIGHT: 105 LBS | HEART RATE: 67 BPM | DIASTOLIC BLOOD PRESSURE: 64 MMHG | SYSTOLIC BLOOD PRESSURE: 106 MMHG | OXYGEN SATURATION: 95 %

## 2025-08-28 DIAGNOSIS — M25.532 PAIN IN LEFT WRIST: ICD-10-CM

## 2025-08-28 DIAGNOSIS — M06.9 RHEUMATOID ARTHRITIS, UNSPECIFIED: ICD-10-CM

## 2025-08-28 PROCEDURE — G2211 COMPLEX E/M VISIT ADD ON: CPT

## 2025-08-28 PROCEDURE — 99215 OFFICE O/P EST HI 40 MIN: CPT

## 2025-08-28 PROCEDURE — 36415 COLL VENOUS BLD VENIPUNCTURE: CPT

## 2025-08-28 RX ORDER — SACUBITRIL AND VALSARTAN 24; 26 MG/1; MG/1
24-26 TABLET, FILM COATED ORAL
Refills: 0 | Status: ACTIVE | COMMUNITY

## 2025-08-28 RX ORDER — SPIRONOLACTONE 25 MG/1
25 TABLET ORAL
Refills: 0 | Status: ACTIVE | COMMUNITY

## 2025-08-29 LAB
ALBUMIN SERPL ELPH-MCNC: 4.3 G/DL
ALP BLD-CCNC: 82 U/L
ALT SERPL-CCNC: 19 U/L
ANION GAP SERPL CALC-SCNC: 13 MMOL/L
AST SERPL-CCNC: 30 U/L
BILIRUB SERPL-MCNC: 0.4 MG/DL
BUN SERPL-MCNC: 22 MG/DL
CALCIUM SERPL-MCNC: 9.8 MG/DL
CHLORIDE SERPL-SCNC: 100 MMOL/L
CO2 SERPL-SCNC: 28 MMOL/L
CREAT SERPL-MCNC: 0.86 MG/DL
CRP SERPL-MCNC: <3 MG/L
EGFRCR SERPLBLD CKD-EPI 2021: 70 ML/MIN/1.73M2
GLUCOSE SERPL-MCNC: 100 MG/DL
POTASSIUM SERPL-SCNC: 4.9 MMOL/L
PROT SERPL-MCNC: 6.8 G/DL
SODIUM SERPL-SCNC: 141 MMOL/L

## 2025-09-02 ENCOUNTER — TRANSCRIPTION ENCOUNTER (OUTPATIENT)
Age: 75
End: 2025-09-02

## 2025-09-02 LAB
BASOPHILS # BLD AUTO: 0.03 K/UL
BASOPHILS NFR BLD AUTO: 0.5 %
EOSINOPHIL # BLD AUTO: 0.17 K/UL
EOSINOPHIL NFR BLD AUTO: 3 %
ERYTHROCYTE [SEDIMENTATION RATE] IN BLOOD BY WESTERGREN METHOD: 59 MM/HR
HCT VFR BLD CALC: 32.9 %
HGB BLD-MCNC: 10 G/DL
IMM GRANULOCYTES NFR BLD AUTO: 0.2 %
LYMPHOCYTES # BLD AUTO: 1.03 K/UL
LYMPHOCYTES NFR BLD AUTO: 18.2 %
MAN DIFF?: NORMAL
MCHC RBC-ENTMCNC: 26.1 PG
MCHC RBC-ENTMCNC: 30.4 G/DL
MCV RBC AUTO: 85.9 FL
MONOCYTES # BLD AUTO: 0.38 K/UL
MONOCYTES NFR BLD AUTO: 6.7 %
NEUTROPHILS # BLD AUTO: 4.05 K/UL
NEUTROPHILS NFR BLD AUTO: 71.4 %
PLATELET # BLD AUTO: 178 K/UL
RBC # BLD: 3.83 M/UL
RBC # FLD: 24.2 %
WBC # FLD AUTO: 5.67 K/UL

## 2025-09-05 ENCOUNTER — TRANSCRIPTION ENCOUNTER (OUTPATIENT)
Age: 75
End: 2025-09-05